# Patient Record
Sex: MALE | Race: WHITE | NOT HISPANIC OR LATINO | Employment: FULL TIME | URBAN - METROPOLITAN AREA
[De-identification: names, ages, dates, MRNs, and addresses within clinical notes are randomized per-mention and may not be internally consistent; named-entity substitution may affect disease eponyms.]

---

## 2017-01-19 ENCOUNTER — OFFICE VISIT (OUTPATIENT)
Dept: CARDIOLOGY | Facility: MEDICAL CENTER | Age: 52
End: 2017-01-19
Payer: COMMERCIAL

## 2017-01-19 VITALS
HEIGHT: 73 IN | BODY MASS INDEX: 28.63 KG/M2 | HEART RATE: 121 BPM | SYSTOLIC BLOOD PRESSURE: 164 MMHG | OXYGEN SATURATION: 96 % | WEIGHT: 216 LBS | DIASTOLIC BLOOD PRESSURE: 100 MMHG

## 2017-01-19 DIAGNOSIS — N52.2 DRUG-INDUCED ERECTILE DYSFUNCTION: ICD-10-CM

## 2017-01-19 DIAGNOSIS — I50.21 ACUTE SYSTOLIC CONGESTIVE HEART FAILURE, NYHA CLASS 4 (HCC): ICD-10-CM

## 2017-01-19 DIAGNOSIS — I42.0 DILATED CARDIOMYOPATHY (HCC): ICD-10-CM

## 2017-01-19 DIAGNOSIS — I10 ESSENTIAL HYPERTENSION: ICD-10-CM

## 2017-01-19 DIAGNOSIS — I42.9 CARDIOMYOPATHY (HCC): ICD-10-CM

## 2017-01-19 DIAGNOSIS — I42.0 CARDIOMYOPATHY, DILATED (HCC): ICD-10-CM

## 2017-01-19 DIAGNOSIS — I10 ESSENTIAL HYPERTENSION, BENIGN: ICD-10-CM

## 2017-01-19 PROCEDURE — 99214 OFFICE O/P EST MOD 30 MIN: CPT | Performed by: INTERNAL MEDICINE

## 2017-01-19 RX ORDER — CHLORTHALIDONE 25 MG/1
25 TABLET ORAL DAILY
Qty: 90 TAB | Refills: 3 | Status: SHIPPED | OUTPATIENT
Start: 2017-01-19 | End: 2018-02-15 | Stop reason: SDUPTHER

## 2017-01-19 RX ORDER — LISINOPRIL 40 MG/1
40 TABLET ORAL 2 TIMES DAILY
Qty: 180 TAB | Refills: 3 | Status: SHIPPED | OUTPATIENT
Start: 2017-01-19 | End: 2018-02-15 | Stop reason: SDUPTHER

## 2017-01-19 RX ORDER — SILDENAFIL 50 MG/1
50 TABLET, FILM COATED ORAL PRN
Qty: 10 TAB | Refills: 11 | Status: SHIPPED | OUTPATIENT
Start: 2017-01-19 | End: 2017-09-06 | Stop reason: SDUPTHER

## 2017-01-19 RX ORDER — CARVEDILOL 25 MG/1
50 TABLET ORAL 2 TIMES DAILY WITH MEALS
Qty: 360 TAB | Refills: 3 | Status: SHIPPED | OUTPATIENT
Start: 2017-01-19 | End: 2018-02-15 | Stop reason: SDUPTHER

## 2017-01-19 RX ORDER — AMLODIPINE BESYLATE 5 MG/1
5 TABLET ORAL DAILY
Qty: 90 TAB | Refills: 3 | Status: SHIPPED | OUTPATIENT
Start: 2017-01-19 | End: 2018-02-15 | Stop reason: SDUPTHER

## 2017-01-19 RX ORDER — SPIRONOLACTONE 25 MG/1
25 TABLET ORAL DAILY
Qty: 90 TAB | Refills: 3 | Status: SHIPPED | OUTPATIENT
Start: 2017-01-19 | End: 2018-02-15 | Stop reason: SDUPTHER

## 2017-01-19 ASSESSMENT — ENCOUNTER SYMPTOMS
PALPITATIONS: 0
SHORTNESS OF BREATH: 0

## 2017-01-19 NOTE — PROGRESS NOTES
"Subjective:   Mack Galloway is a 51 y.o. male who presents today for follow up of cardiomyopathy    Doing generally very well.    No heart failure symptoms.  \"I feel great\"    They stopped filling meds so he stopped taking them    Past Medical History   Diagnosis Date   • Diabetes mellitus (CMS-[facility]) 10/29/2014     2007    • Acute systolic congestive heart failure, NYHA class 4 (CMS-HCC) 10/29/2014   • Type II or unspecified type diabetes mellitus without mention of complication, not stated as uncontrolled    • Hypertension      Past Surgical History   Procedure Laterality Date   • Appendectomy     • Recovery  2/3/2015     Performed by Cath-Recovery Surgery at SURGERY SAME DAY [facility] ORS     Family History   Problem Relation Age of Onset   • Heart Disease Father      CAD   • Heart Disease Paternal Grandfather    • Diabetes Father      History   Smoking status   • Never Smoker    Smokeless tobacco   • Current User   • Types: Chew     Allergies   Allergen Reactions   • Kiwi Extract Swelling     Outpatient Encounter Prescriptions as of 1/19/2017   Medication Sig Dispense Refill   • lisinopril (PRINIVIL, ZESTRIL) 40 MG tablet Take 1 Tab by mouth 2 times a day. 60 Tab 6   • spironolactone (ALDACTONE) 25 MG Tab Take 1 Tab by mouth every day. 30 Tab 11   • metformin ER (GLUCOPHAGE XR) 500 MG TABLET SR 24 HR Take 1 Tab by mouth 2 times a day. 60 Tab 11   • aspirin (ASA) 81 MG CHEW chewable tablet Take 1 Tab by mouth every day. 100 Tab 2   • carvedilol (COREG) 25 MG Tab TAKE TWO TABLETS BY MOUTH TWICE DAILY WITH MEALS (Patient not taking: Reported on 1/19/2017) 120 Tab 0   • amlodipine (NORVASC) 5 MG Tab TAKE ONE TABLET BY MOUTH ONE TIME DAILY  (Patient not taking: Reported on 1/19/2017) 30 Tab 6   • sildenafil citrate (VIAGRA) 50 MG tablet Take 1 Tab by mouth as needed for Erectile Dysfunction. (Patient not taking: Reported on 1/19/2017) 10 Tab 3   • chlorthalidone (HYGROTON) 25 MG TABS TAKE ONE TABLET BY MOUTH ONE " "TIME DAILY  (Patient not taking: Reported on 1/19/2017) 30 Tab 3     No facility-administered encounter medications on file as of 1/19/2017.     Review of Systems   Respiratory: Negative for shortness of breath.    Cardiovascular: Negative for chest pain and palpitations.        Objective:   /100 mmHg  Pulse 121  Ht 1.854 m (6' 0.99\")  Wt 97.977 kg (216 lb)  BMI 28.50 kg/m2  SpO2 96%    Physical Exam   Constitutional: He is oriented to person, place, and time. He appears well-developed and well-nourished. No distress.   HENT:   Head: Normocephalic and atraumatic.   Right Ear: External ear normal.   Left Ear: External ear normal.   Nose: Nose normal.   Mouth/Throat: Oropharynx is clear and moist.   Eyes: Conjunctivae and EOM are normal. Pupils are equal, round, and reactive to light. Right eye exhibits no discharge. Left eye exhibits no discharge. No scleral icterus.   Neck: Normal range of motion. Neck supple. No JVD present. No tracheal deviation present.   Cardiovascular: Normal rate, regular rhythm, normal heart sounds and intact distal pulses.  Exam reveals no gallop and no friction rub.    No murmur heard.  Pulmonary/Chest: Effort normal and breath sounds normal. No stridor. No respiratory distress. He has no wheezes. He has no rales. He exhibits no tenderness.   Abdominal: Soft. He exhibits no distension. There is no tenderness.   Musculoskeletal: He exhibits no edema or tenderness.   Neurological: He is alert and oriented to person, place, and time. No cranial nerve deficit. Coordination normal.   Skin: Skin is warm and dry. No rash noted. He is not diaphoretic. No erythema. No pallor.   Psychiatric: He has a normal mood and affect. His behavior is normal. Judgment and thought content normal.   Vitals reviewed.      Assessment:     1. Cardiomyopathy, dilated (CMS-HCC)  EKG   2. Essential hypertension  EKG       Medical Decision Making:  Today's Assessment / Status / Plan:     Doing well.  Will wait " and reimage at follow up as he has been off meds.  Need better treatment of htn.  Get back on all meds.  Labs afterward.

## 2017-01-19 NOTE — MR AVS SNAPSHOT
"        Mack Galloway   2017 8:40 AM   Office Visit   MRN: [id number]    Department:  [facility]   Dept Phone:  [phone]    Description:  Male : 1965   Provider:  Sarath Potter M.D.           Reason for Visit     Follow-Up           Allergies as of 2017     Allergen Noted Reactions    Kiwi Extract 10/31/2014   Swelling      You were diagnosed with     Cardiomyopathy, dilated (CMS-HCC)   [767379]       Essential hypertension   [0971177]       Dilated cardiomyopathy (CMS-HCC)   [525001]       Acute systolic congestive heart failure, NYHA class 4 (CMS-HCC)   [740051]       Cardiomyopathy (CMS-HCC)   [7607311]       Drug-induced erectile dysfunction   [958040]       Essential hypertension, benign   [401.1.ICD-9-CM]         Vital Signs     Blood Pressure Pulse Height Weight Body Mass Index Oxygen Saturation    164/100 mmHg 121 1.854 m (6' 0.99\") 97.977 kg (216 lb) 28.50 kg/m2 96%    Smoking Status                   Never Smoker            Basic Information     Date Of Birth Sex Race Ethnicity Preferred Language    1965 Male White Non- English      Problem List              ICD-10-CM Priority Class Noted - Resolved    Diabetes mellitus (CMS-HCC) E11.9   10/29/2014 - Present    Dilated cardiomyopathy (CMS-HCC) I42.0 High  10/29/2014 - Present    DM (diabetes mellitus) (CMS-HCC) E11.9 Medium  10/31/2014 - Present    Hypertension I10 Medium  10/31/2014 - Present    Chronic systolic congestive heart failure, NYHA class 3 (CMS-[facility]) I50.22   2015 - Present    Other primary cardiomyopathies (CMS-HCC) I42.8   2/3/2015 - Present      Health Maintenance        Date Due Completion Dates    IMM HEP B VACCINE (1 of 3 - Primary Series) 1965 ---    DIABETES MONOFILAMENT / LE EXAM 1964 ---    RETINAL SCREENING 1981 ---    URINE ACR / MICROALBUMIN 1981 ---    IMM DTaP/Tdap/Td Vaccine (1 - Tdap) 1982 ---    COLONOSCOPY 2013 ---    A1C SCREENING 2015 10/29/2014   " FASTING LIPID PROFILE 10/30/2015 10/30/2014    SERUM CREATININE 2/3/2016 2/3/2015, 11/2/2014, 11/1/2014, 10/30/2014, 10/29/2014    IMM INFLUENZA (1) 9/1/2016 ---            Results       Current Immunizations     Pneumococcal polysaccharide vaccine (PPSV-23) 10/29/2014  2:40 PM      Below and/or attached are the medications your provider expects you to take. Review all of your home medications and newly ordered medications with your provider and/or pharmacist. Follow medication instructions as directed by your provider and/or pharmacist. Please keep your medication list with you and share with your provider. Update the information when medications are discontinued, doses are changed, or new medications (including over-the-counter products) are added; and carry medication information at all times in the event of emergency situations     Allergies:  KIWI EXTRACT - Swelling               Medications  Valid as of: January 19, 2017 -  9:16 AM    Generic Name Brand Name Tablet Size Instructions for use    AmLODIPine Besylate (Tab) NORVASC 5 MG Take 1 Tab by mouth every day.        Aspirin (Chew Tab) ASA 81 MG Take 1 Tab by mouth every day.        Carvedilol (Tab) COREG 25 MG Take 2 Tabs by mouth 2 times a day, with meals.        Chlorthalidone (Tab) HYGROTON 25 MG Take 1 Tab by mouth every day.        Lisinopril (Tab) PRINIVIL, ZESTRIL 40 MG Take 1 Tab by mouth 2 times a day.        MetFORMIN HCl (TABLET SR 24 HR) GLUCOPHAGE  MG Take 1 Tab by mouth 2 times a day.        Sildenafil Citrate (Tab) VIAGRA 50 MG Take 1 Tab by mouth as needed for Erectile Dysfunction.        Spironolactone (Tab) ALDACTONE 25 MG Take 1 Tab by mouth every day.        .                 Medicines prescribed today were sent to:     [id number] [place] [facility] [place] - [address]    [address] [place] [address]    Phone: [phone] Fax: [phone]    Open 24 Hours?: No      Medication refill instructions:       If your  prescription bottle indicates you have medication refills left, it is not necessary to call your provider’s office. Please contact your pharmacy and they will refill your medication.    If your prescription bottle indicates you do not have any refills left, you may request refills at any time through one of the following ways: The online [employer] system (except Urgent Care), by calling your provider’s office, or by asking your pharmacy to contact your provider’s office with a refill request. Medication refills are processed only during regular business hours and may not be available until the next business day. Your provider may request additional information or to have a follow-up visit with you prior to refilling your medication.   *Please Note: Medication refills are assigned a new Rx number when refilled electronically. Your pharmacy may indicate that no refills were authorized even though a new prescription for the same medication is available at the pharmacy. Please request the medicine by name with the pharmacy before contacting your provider for a refill.        Your To Do List     Future Labs/Procedures Complete By Expires    BASIC METABOLIC PANEL  As directed 1/19/2018         [employer] Status: Patient Declined

## 2017-01-20 LAB — EKG IMPRESSION: NORMAL

## 2017-04-25 ENCOUNTER — TELEPHONE (OUTPATIENT)
Dept: CARDIOLOGY | Facility: MEDICAL CENTER | Age: 52
End: 2017-04-25

## 2017-04-25 NOTE — TELEPHONE ENCOUNTER
----- Message from Casey Young sent at 4/25/2017 10:42 AM PDT -----  Regarding: Problem with medication   Contact: [phone]  QUINTIN/Mile,    Patient states he's experiencing some side affects due to his medication(carvedilol) such as dizziness. He would please like a call back and can be reached at [phone].

## 2017-04-25 NOTE — TELEPHONE ENCOUNTER
Pt states he has been getting lightheaded, dizzy and nauseated since starting carvedilol in Jan. Happens every time he takes and lasts all day long he states. He never made it to max dose requested by JE but has no idea what dose he is on. Pt advised call in am with dose when he gets home and can check it, check BP if gets lightheaded and dizzy at home.  Then will discuss with JE to advise on dose

## 2017-09-05 ENCOUNTER — TELEPHONE (OUTPATIENT)
Dept: CARDIOLOGY | Facility: MEDICAL CENTER | Age: 52
End: 2017-09-05

## 2017-09-05 NOTE — TELEPHONE ENCOUNTER
----- Message from Lauryn Hastings, Med Ass't sent at 9/5/2017 10:26 AM PDT -----  Regarding: FW: renewal viagra  Contact: [phone]  Okay to fill?  Please phone in new RX or JE approve if so  Thank you     ----- Message -----  From: Libia Ly  Sent: 9/5/2017   8:30 AM  To: Lauryn Hastings Med Ass't  Subject: renewal viagra                                   QUINTIN/lauryn    Pt calling for renewal of sildenafil citrate (VIAGRA) 50 MG tablet.    Pt will be coming from [place], arriving at pharmacy around noon today, to  all other meds and is hoping this will be ready for  as well.  ([facility] on [address] in [place])    Pt is at  if questions.

## 2017-09-06 DIAGNOSIS — N52.2 DRUG-INDUCED ERECTILE DYSFUNCTION: ICD-10-CM

## 2017-09-06 RX ORDER — SILDENAFIL 50 MG/1
50 TABLET, FILM COATED ORAL PRN
Qty: 10 TAB | Refills: 6 | Status: SHIPPED | OUTPATIENT
Start: 2017-09-06 | End: 2017-10-12 | Stop reason: SDUPTHER

## 2017-10-05 ENCOUNTER — TELEPHONE (OUTPATIENT)
Dept: CARDIOLOGY | Facility: MEDICAL CENTER | Age: 52
End: 2017-10-05

## 2017-10-05 NOTE — TELEPHONE ENCOUNTER
----- Message from Lauryn Hastings, Med Ass't sent at 10/5/2017  3:00 PM PDT -----  Regarding: FW: Patient wants prescription for Viagra  I spoke with the patient and he would like a new RX phoned in with a new dose, patient states that he needs the RX to state 2 tablets for a 100mg dose    ----- Message -----  From: Dayana Cardenas  Sent: 10/5/2017   2:43 PM  To: Lauryn Hastings, Med Ass't  Subject: Patient wants prescription for Viagra            Lauryn,    This is a patient of Dr Potter. He wants to get a prescription for Viagra.

## 2017-10-05 NOTE — TELEPHONE ENCOUNTER
Dr. Potter are you OK with prescribing viagra as a dose of two 50mg tablets to equal 100mg?     You previously OK'd the 50mg tablets, #10.

## 2017-10-12 DIAGNOSIS — N52.2 DRUG-INDUCED ERECTILE DYSFUNCTION: ICD-10-CM

## 2017-10-12 RX ORDER — SILDENAFIL 50 MG/1
100 TABLET, FILM COATED ORAL PRN
Qty: 20 TAB | Refills: 0
Start: 2017-10-12

## 2018-02-15 DIAGNOSIS — I42.0 DILATED CARDIOMYOPATHY (HCC): ICD-10-CM

## 2018-02-15 DIAGNOSIS — I50.21 ACUTE SYSTOLIC CONGESTIVE HEART FAILURE, NYHA CLASS 4 (HCC): ICD-10-CM

## 2018-02-15 DIAGNOSIS — I10 ESSENTIAL HYPERTENSION, BENIGN: ICD-10-CM

## 2018-02-15 DIAGNOSIS — I10 ESSENTIAL HYPERTENSION: ICD-10-CM

## 2018-02-15 RX ORDER — AMLODIPINE BESYLATE 5 MG/1
5 TABLET ORAL DAILY
Qty: 90 TAB | Refills: 0 | OUTPATIENT
Start: 2018-02-15 | End: 2020-10-16

## 2018-02-15 RX ORDER — SPIRONOLACTONE 25 MG/1
25 TABLET ORAL DAILY
Qty: 90 TAB | Refills: 0 | OUTPATIENT
Start: 2018-02-15 | End: 2020-10-16

## 2018-02-15 RX ORDER — CHLORTHALIDONE 25 MG/1
25 TABLET ORAL DAILY
Qty: 90 TAB | Refills: 0 | OUTPATIENT
Start: 2018-02-15 | End: 2020-10-16

## 2018-02-15 RX ORDER — CARVEDILOL 25 MG/1
50 TABLET ORAL 2 TIMES DAILY WITH MEALS
Qty: 360 TAB | Refills: 0 | OUTPATIENT
Start: 2018-02-15 | End: 2020-10-16

## 2018-02-15 RX ORDER — LISINOPRIL 40 MG/1
40 TABLET ORAL 2 TIMES DAILY
Qty: 180 TAB | Refills: 0 | OUTPATIENT
Start: 2018-02-15 | End: 2018-09-27 | Stop reason: SDUPTHER

## 2018-04-02 ENCOUNTER — TELEPHONE (OUTPATIENT)
Dept: CARDIOLOGY | Facility: MEDICAL CENTER | Age: 53
End: 2018-04-02

## 2018-04-02 NOTE — TELEPHONE ENCOUNTER
PAR sent to [facility] in [place]:  STEVIE SHORE (Key: [id number]) - [id number]   Status: Sent to Plan on April 2nd, 2018  The plan will fax you a determination, typically within 1 to 5 business days.   Drug: Sildenafil Citrate 50MG tablets   Form: Medical Necessity Request Form for [employer] Members Only.   Phone: [phone]   Fax: [phone]   Claim Rejection Details   Code: 70  Message: NDC Not Covered     Status  Sent to [facility]  Next Steps  The plan will fax you a determination, typically within 1 to 5 business days.  How do I follow up?  DrugSildenafil Citrate 50MG tablets  Form[employer] [place] [employer] Medical Necessity Request FormMedical Necessity Request Form for [employer] Members [phone]  Original Claim Info70 NDC Not Covered

## 2018-09-27 DIAGNOSIS — I42.0 DILATED CARDIOMYOPATHY (HCC): ICD-10-CM

## 2018-09-27 DIAGNOSIS — I50.21 ACUTE SYSTOLIC CONGESTIVE HEART FAILURE, NYHA CLASS 4 (HCC): ICD-10-CM

## 2018-10-01 RX ORDER — LISINOPRIL 40 MG/1
40 TABLET ORAL 2 TIMES DAILY
Qty: 180 TAB | Refills: 0 | Status: SHIPPED | OUTPATIENT
Start: 2018-10-01 | End: 2020-10-16

## 2018-10-12 ENCOUNTER — TELEPHONE (OUTPATIENT)
Dept: CARDIOLOGY | Facility: MEDICAL CENTER | Age: 53
End: 2018-10-12

## 2018-11-07 ENCOUNTER — TELEPHONE (OUTPATIENT)
Dept: CARDIOLOGY | Facility: MEDICAL CENTER | Age: 53
End: 2018-11-07

## 2018-11-07 NOTE — TELEPHONE ENCOUNTER
Lm and letter sent to cb and sched PP with [facility] in [place] or [place] for future rx refills.

## 2020-10-16 ENCOUNTER — TELEPHONE (OUTPATIENT)
Dept: CARDIOLOGY | Facility: MEDICAL CENTER | Age: 55
End: 2020-10-16

## 2020-10-16 ENCOUNTER — OFFICE VISIT (OUTPATIENT)
Dept: CARDIOLOGY | Facility: MEDICAL CENTER | Age: 55
End: 2020-10-16
Payer: COMMERCIAL

## 2020-10-16 VITALS
BODY MASS INDEX: 27.85 KG/M2 | OXYGEN SATURATION: 95 % | HEIGHT: 74 IN | WEIGHT: 217 LBS | HEART RATE: 79 BPM | SYSTOLIC BLOOD PRESSURE: 142 MMHG | DIASTOLIC BLOOD PRESSURE: 90 MMHG

## 2020-10-16 DIAGNOSIS — I50.20 ACC/AHA STAGE C SYSTOLIC HEART FAILURE (HCC): ICD-10-CM

## 2020-10-16 DIAGNOSIS — Z79.899 HIGH RISK MEDICATION USE: ICD-10-CM

## 2020-10-16 DIAGNOSIS — I10 HTN (HYPERTENSION), MALIGNANT: ICD-10-CM

## 2020-10-16 DIAGNOSIS — I42.8 NON-ISCHEMIC CARDIOMYOPATHY (HCC): ICD-10-CM

## 2020-10-16 DIAGNOSIS — R06.09 DYSPNEA ON EXERTION: ICD-10-CM

## 2020-10-16 DIAGNOSIS — I51.89 LEFT VENTRICULAR SYSTOLIC DYSFUNCTION, NYHA CLASS 2: ICD-10-CM

## 2020-10-16 LAB — EKG IMPRESSION: NORMAL

## 2020-10-16 PROCEDURE — 93000 ELECTROCARDIOGRAM COMPLETE: CPT | Performed by: INTERNAL MEDICINE

## 2020-10-16 PROCEDURE — 99205 OFFICE O/P NEW HI 60 MIN: CPT | Performed by: INTERNAL MEDICINE

## 2020-10-16 RX ORDER — CARVEDILOL 12.5 MG/1
12.5 TABLET ORAL 2 TIMES DAILY WITH MEALS
Qty: 60 TAB | Refills: 11 | Status: SHIPPED | OUTPATIENT
Start: 2020-10-16 | End: 2020-12-29

## 2020-10-16 RX ORDER — SPIRONOLACTONE 25 MG/1
25 TABLET ORAL DAILY
Qty: 30 TAB | Refills: 3 | Status: SHIPPED | OUTPATIENT
Start: 2020-10-16 | End: 2021-03-15 | Stop reason: SDUPTHER

## 2020-10-16 RX ORDER — POTASSIUM CHLORIDE 750 MG/1
CAPSULE, EXTENDED RELEASE ORAL
COMMUNITY
Start: 2020-09-23 | End: 2021-03-31

## 2020-10-16 RX ORDER — SACUBITRIL AND VALSARTAN 49; 51 MG/1; MG/1
1 TABLET, FILM COATED ORAL 2 TIMES DAILY
Qty: 60 TAB | Refills: 11 | Status: SHIPPED | OUTPATIENT
Start: 2020-10-16 | End: 2020-12-09

## 2020-10-16 RX ORDER — FUROSEMIDE 40 MG/1
TABLET ORAL
COMMUNITY
Start: 2020-10-08 | End: 2020-10-16

## 2020-10-16 RX ORDER — LOSARTAN POTASSIUM 100 MG/1
TABLET ORAL
COMMUNITY
Start: 2020-09-23 | End: 2020-10-16

## 2020-10-16 RX ORDER — FUROSEMIDE 40 MG/1
40 TABLET ORAL DAILY
Qty: 30 TAB | Refills: 11 | Status: SHIPPED | OUTPATIENT
Start: 2020-10-16 | End: 2020-12-29 | Stop reason: SDUPTHER

## 2020-10-16 ASSESSMENT — ENCOUNTER SYMPTOMS
FEVER: 0
SENSORY CHANGE: 0
SHORTNESS OF BREATH: 1
DIAPHORESIS: 0
COUGH: 0
BRUISES/BLEEDS EASILY: 0
PALPITATIONS: 0
DOUBLE VISION: 0
MEMORY LOSS: 0
FALLS: 0
MYALGIAS: 0
DIZZINESS: 0
BLURRED VISION: 0
ABDOMINAL PAIN: 0
HEADACHES: 0
DEPRESSION: 0

## 2020-10-16 NOTE — PATIENT INSTRUCTIONS
Stop Losartan 100 mg.    Start Entresto 49/51 mg twice day.    Will increase Carvedilol to 12.5 mg po twice daily.    Start Spironolactone 25 mg once a day.    Reduce Furosemide to 40 mg once a day (this one, you could on your own based swelling).

## 2020-10-16 NOTE — PROGRESS NOTES
Chief Complaint   Patient presents with   • HTN (Controlled)       Subjective:   Mack Galloway is a 55 y.o. male who presents today for cardiac care and management in a heart failure clinic due to recent finding of LV dysfunction on echocardiogram which showed LVEF of 25%.  Patient does have a prior history of dilated cardiomyopathy for which he was cared for by Dr. Sarath Potter in the past.  Patient did have a coronary angiogram which showed nonobstructive CAD in 2015. His EF normalized however, patient stopped going to the doctor and stopped taking his medications.  He is now back with dilated cardiomyopathy.    Patient is feeling better these days. Does get winded upon walking up inclines or for distance. No symptoms at rest or with daily living activities.    I have personally interpreted her EKG today with patient, there is no evidence of acute coronary syndrome, no evidence of prior infarct, normal NV and QT interval, no significant conduction disease.      Past Medical History:   Diagnosis Date   • Acute systolic congestive heart failure, NYHA class 4 ([facility]) 10/29/2014   • Diabetes mellitus ([facility]) 10/29/2014    2007    • Hypertension    • Type II or unspecified type diabetes mellitus without mention of complication, not stated as uncontrolled      Past Surgical History:   Procedure Laterality Date   • RECOVERY  2/3/2015    Performed by Cath-Recovery Surgery at SURGERY SAME DAY [facility] ORS   • APPENDECTOMY       Family History   Problem Relation Age of Onset   • Heart Disease Father         CAD   • Heart Disease Paternal Grandfather    • Diabetes Father      Social History     Socioeconomic History   • Marital status: Single     Spouse name: Not on file   • Number of children: Not on file   • Years of education: Not on file   • Highest education level: Not on file   Occupational History   • Not on file   Social Needs   • Financial resource strain: Not on file   • Food insecurity     Worry: Not on file      Inability: Not on file   • Transportation needs     Medical: Not on file     Non-medical: Not on file   Tobacco Use   • Smoking status: Never Smoker   • Smokeless tobacco: Current User     Types: Chew   Substance and Sexual Activity   • Alcohol use: No   • Drug use: No   • Sexual activity: Not on file   Lifestyle   • Physical activity     Days per week: Not on file     Minutes per session: Not on file   • Stress: Not on file   Relationships   • Social connections     Talks on phone: Not on file     Gets together: Not on file     Attends Faith service: Not on file     Active member of club or organization: Not on file     Attends meetings of clubs or organizations: Not on file     Relationship status: Not on file   • Intimate partner violence     Fear of current or ex partner: Not on file     Emotionally abused: Not on file     Physically abused: Not on file     Forced sexual activity: Not on file   Other Topics Concern   • Not on file   Social History Narrative   • Not on file     Allergies   Allergen Reactions   • Kiwi Extract Swelling     Outpatient Encounter Medications as of 10/16/2020   Medication Sig Dispense Refill   • potassium chloride (MICRO-K) 10 MEQ capsule      • sacubitril-valsartan (ENTRESTO) 49-51 MG Tab tablet Take 1 Tab by mouth 2 Times a Day. 60 Tab 11   • carvedilol (COREG) 12.5 MG Tab Take 1 Tab by mouth 2 times a day, with meals. 60 Tab 11   • spironolactone (ALDACTONE) 25 MG Tab Take 1 Tab by mouth every day. 30 Tab 3   • furosemide (LASIX) 40 MG Tab Take 1 Tab by mouth every day. 30 Tab 11   • sildenafil citrate (VIAGRA) 50 MG tablet Take 2 Tabs by mouth as needed for Erectile Dysfunction. 20 Tab 0   • metformin ER (GLUCOPHAGE XR) 500 MG TABLET SR 24 HR Take 1 Tab by mouth 2 times a day. 60 Tab 11   • metFORMIN (GLUCOPHAGE) 500 MG Tab      • [DISCONTINUED] losartan (COZAAR) 100 MG Tab      • [DISCONTINUED] furosemide (LASIX) 40 MG Tab      • [DISCONTINUED] lisinopril (PRINIVIL, ZESTRIL)  "40 MG tablet Take 1 Tab by mouth 2 times a day. For further refills please contact new cardiologist. Thank you (Patient not taking: Reported on 10/16/2020) 180 Tab 0   • [DISCONTINUED] spironolactone (ALDACTONE) 25 MG Tab Take 1 Tab by mouth every day. (Patient not taking: Reported on 10/16/2020) 90 Tab 0   • [DISCONTINUED] carvedilol (COREG) 25 MG Tab Take 2 Tabs by mouth 2 times a day, with meals. (Patient taking differently: Take 6.25 mg by mouth 2 times a day, with meals.) 360 Tab 0   • [DISCONTINUED] amLODIPine (NORVASC) 5 MG Tab Take 1 Tab by mouth every day. (Patient not taking: Reported on 10/16/2020) 90 Tab 0   • [DISCONTINUED] chlorthalidone (HYGROTON) 25 MG Tab Take 1 Tab by mouth every day. (Patient not taking: Reported on 10/16/2020) 90 Tab 0   • [DISCONTINUED] aspirin (ASA) 81 MG CHEW chewable tablet Take 1 Tab by mouth every day. (Patient not taking: Reported on 10/16/2020) 100 Tab 2     No facility-administered encounter medications on file as of 10/16/2020.      Review of Systems   Constitutional: Negative for diaphoresis and fever.   HENT: Negative for nosebleeds.    Eyes: Negative for blurred vision and double vision.   Respiratory: Positive for shortness of breath. Negative for cough.    Cardiovascular: Negative for chest pain and palpitations.   Gastrointestinal: Negative for abdominal pain.   Genitourinary: Negative for dysuria and frequency.   Musculoskeletal: Negative for falls and myalgias.   Skin: Negative for rash.   Neurological: Negative for dizziness, sensory change and headaches.   Endo/Heme/Allergies: Does not bruise/bleed easily.   Psychiatric/Behavioral: Negative for depression and memory loss.        Objective:   /90 (BP Location: Left arm, Patient Position: Sitting, BP Cuff Size: Adult)   Pulse 79   Ht 1.88 m (6' 2\")   Wt 98.4 kg (217 lb)   SpO2 95%   BMI 27.86 kg/m²     Physical Exam  Constitutional:   Well appearing, no acute distress  Heart: no pitting edema in " BLE.  Lungs: no breathing distress, not tachypneic  Abdomen: no distention  Neurology: no signs of focal deficits.  Mentation is alert.    Assessment:     1. HTN (hypertension), malignant  EKG    Basic Metabolic Panel   2. ACC/AHA stage C systolic heart failure (HCC)  Basic Metabolic Panel   3. Left ventricular systolic dysfunction, NYHA class 2  Basic Metabolic Panel   4. Non-ischemic cardiomyopathy (HCC)  Basic Metabolic Panel   5. Dyspnea on exertion  Basic Metabolic Panel   6. High risk medication use         Medical Decision Making:  Today's Assessment / Status / Plan:   Non Ischemic Cardiomyopathy:  Chronically illed condition which requires ongoing close monitoring and treatment to improve survival rate along with decreasing risk of clinical decompensation and hospitalization.    Today, based on physical examination findings, patient is euvolemic. No JVD, lungs are clear to auscultation, no pitting edema in bilateral lower extremities, no ascites.     Dry weight is 217 lbs.     Stop Losartan 100 mg.    Start Entresto 49/51 mg twice day.    Will increase Carvedilol to 12.5 mg po twice daily.    Start Spironolactone 25 mg once a day.    Reduce Furosemide to 40 mg once a day (this one, you could on your own based swelling).    Suspect HTN heart disease underlying as cause of dilated cardiomyopathy.    We will consider ICD placement for primary prevention in 3-6 months if her left ventricular systolic function remains less than 35% after optimization of her evidence based heart failure medical regimen.    Hypertension:  Blood pressure is high.  Optimize control using cardiomyopathy medical regimen as well.

## 2020-10-16 NOTE — TELEPHONE ENCOUNTER
Mack Galloway (Key: [id number]) - [id number]  Entresto 49-51MG tablets  Status: Sent to Plan  Created: October 16th, 2020 [phone]  Sent: October 16th, 2020  Open  [place] as not sent  Archive

## 2020-10-23 ENCOUNTER — TELEPHONE (OUTPATIENT)
Dept: CARDIOLOGY | Facility: MEDICAL CENTER | Age: 55
End: 2020-10-23

## 2020-10-23 NOTE — TELEPHONE ENCOUNTER
TT    10/23/20 - Per [facility] Report - Jeannette with [employer] would like a call back. She spoke with pt's insurance plan and they are waiting on the additional clinical questions that was sent to the office, if information is not submitted by 10/23, it will be considered not approved for the pt's entresto reference #[id number].    Please return call to [phone].    Thank you

## 2020-12-03 DIAGNOSIS — I42.8 NON-ISCHEMIC CARDIOMYOPATHY (HCC): ICD-10-CM

## 2020-12-03 DIAGNOSIS — I51.89 LEFT VENTRICULAR SYSTOLIC DYSFUNCTION, NYHA CLASS 2: ICD-10-CM

## 2020-12-03 DIAGNOSIS — I10 HTN (HYPERTENSION), MALIGNANT: ICD-10-CM

## 2020-12-03 DIAGNOSIS — I50.20 ACC/AHA STAGE C SYSTOLIC HEART FAILURE (HCC): ICD-10-CM

## 2020-12-03 DIAGNOSIS — R06.09 DYSPNEA ON EXERTION: ICD-10-CM

## 2020-12-09 ENCOUNTER — TELEPHONE (OUTPATIENT)
Dept: CARDIOLOGY | Facility: MEDICAL CENTER | Age: 55
End: 2020-12-09

## 2020-12-09 ENCOUNTER — OFFICE VISIT (OUTPATIENT)
Dept: CARDIOLOGY | Facility: MEDICAL CENTER | Age: 55
End: 2020-12-09
Payer: COMMERCIAL

## 2020-12-09 VITALS
DIASTOLIC BLOOD PRESSURE: 98 MMHG | OXYGEN SATURATION: 95 % | SYSTOLIC BLOOD PRESSURE: 132 MMHG | BODY MASS INDEX: 26.69 KG/M2 | RESPIRATION RATE: 18 BRPM | WEIGHT: 208 LBS | HEART RATE: 73 BPM | HEIGHT: 74 IN

## 2020-12-09 DIAGNOSIS — I42.8 NON-ISCHEMIC CARDIOMYOPATHY (HCC): ICD-10-CM

## 2020-12-09 DIAGNOSIS — Z79.899 HIGH RISK MEDICATION USE: ICD-10-CM

## 2020-12-09 DIAGNOSIS — I10 HTN (HYPERTENSION), MALIGNANT: ICD-10-CM

## 2020-12-09 DIAGNOSIS — I50.20 ACC/AHA STAGE C SYSTOLIC HEART FAILURE (HCC): ICD-10-CM

## 2020-12-09 DIAGNOSIS — I51.89 LEFT VENTRICULAR SYSTOLIC DYSFUNCTION, NYHA CLASS 2: ICD-10-CM

## 2020-12-09 DIAGNOSIS — R06.09 DYSPNEA ON EXERTION: ICD-10-CM

## 2020-12-09 PROCEDURE — 99215 OFFICE O/P EST HI 40 MIN: CPT | Mod: 25 | Performed by: INTERNAL MEDICINE

## 2020-12-09 RX ORDER — FUROSEMIDE 20 MG/1
TABLET ORAL
COMMUNITY
Start: 2020-09-23 | End: 2020-12-09

## 2020-12-09 RX ORDER — SACUBITRIL AND VALSARTAN 97; 103 MG/1; MG/1
1 TABLET, FILM COATED ORAL 2 TIMES DAILY
Qty: 60 TAB | Refills: 11 | Status: SHIPPED | OUTPATIENT
Start: 2020-12-09 | End: 2020-12-29 | Stop reason: SDUPTHER

## 2020-12-09 RX ORDER — CARVEDILOL 6.25 MG/1
TABLET ORAL
COMMUNITY
Start: 2020-10-08 | End: 2020-12-09

## 2020-12-09 RX ORDER — CARVEDILOL 3.12 MG/1
TABLET ORAL
COMMUNITY
Start: 2020-10-01 | End: 2020-12-09

## 2020-12-09 RX ORDER — LOSARTAN POTASSIUM 100 MG/1
TABLET ORAL
COMMUNITY
Start: 2020-10-28 | End: 2020-12-09

## 2020-12-09 RX ORDER — DAPAGLIFLOZIN 10 MG/1
10 TABLET, FILM COATED ORAL DAILY
Qty: 30 TAB | Refills: 11 | Status: SHIPPED | OUTPATIENT
Start: 2020-12-09 | End: 2020-12-29 | Stop reason: SDUPTHER

## 2020-12-09 ASSESSMENT — ENCOUNTER SYMPTOMS
BRUISES/BLEEDS EASILY: 0
ABDOMINAL PAIN: 0
DIAPHORESIS: 0
DEPRESSION: 0
PALPITATIONS: 0
DOUBLE VISION: 0
FEVER: 0
MEMORY LOSS: 0
DIZZINESS: 0
SENSORY CHANGE: 0
HEADACHES: 0
MYALGIAS: 0
COUGH: 0
SHORTNESS OF BREATH: 1
FALLS: 0
BLURRED VISION: 0

## 2020-12-09 NOTE — PROGRESS NOTES
Chief Complaint   Patient presents with   • HTN (Controlled)     &  Dilated cardiomyopathy        Subjective:   Mack Galloway is a 55 y.o. male who presents today for cardiac care and management in a heart failure clinic due to recent finding of LV dysfunction on echocardiogram which showed LVEF of 25%.  Patient does have a prior history of dilated cardiomyopathy for which he was cared for by Dr. Sarath Potter in the past.  Patient did have a coronary angiogram which showed nonobstructive CAD in 2015. His EF normalized however, patient stopped going to the doctor and stopped taking his medications.  He is now back with dilated cardiomyopathy.    Patient is feeling better these days. Does get winded upon walking up inclines or for distance. No symptoms at rest or with daily living activities.    I have personally interpreted her EKG today with patient, there is no evidence of acute coronary syndrome, no evidence of prior infarct, normal HI and QT interval, no significant conduction disease.    I have independently interpreted and reviewed blood tests results with patient in clinic which shows normal GFR, K of 4.2.      Past Medical History:   Diagnosis Date   • Acute systolic congestive heart failure, NYHA class 4 ([facility]) 10/29/2014   • Diabetes mellitus ([facility]) 10/29/2014    2007    • Hypertension    • Type II or unspecified type diabetes mellitus without mention of complication, not stated as uncontrolled      Past Surgical History:   Procedure Laterality Date   • RECOVERY  2/3/2015    Performed by Cath-Recovery Surgery at SURGERY SAME DAY [facility]   • APPENDECTOMY       Family History   Problem Relation Age of Onset   • Heart Disease Father         CAD   • Heart Disease Paternal Grandfather    • Diabetes Father      Social History     Socioeconomic History   • Marital status: Single     Spouse name: Not on file   • Number of children: Not on file   • Years of education: Not on file   • Highest education level:  Not on file   Occupational History   • Not on file   Social Needs   • Financial resource strain: Not on file   • Food insecurity     Worry: Not on file     Inability: Not on file   • Transportation needs     Medical: Not on file     Non-medical: Not on file   Tobacco Use   • Smoking status: Never Smoker   • Smokeless tobacco: Current User     Types: Chew   Substance and Sexual Activity   • Alcohol use: No   • Drug use: No   • Sexual activity: Not on file   Lifestyle   • Physical activity     Days per week: Not on file     Minutes per session: Not on file   • Stress: Not on file   Relationships   • Social connections     Talks on phone: Not on file     Gets together: Not on file     Attends [employer] service: Not on file     Active member of club or organization: Not on file     Attends meetings of clubs or organizations: Not on file     Relationship status: Not on file   • Intimate partner violence     Fear of current or ex partner: Not on file     Emotionally abused: Not on file     Physically abused: Not on file     Forced sexual activity: Not on file   Other Topics Concern   • Not on file   Social History Narrative   • Not on file     Allergies   Allergen Reactions   • Kiwi Extract Swelling     Outpatient Encounter Medications as of 12/9/2020   Medication Sig Dispense Refill   • sacubitril-valsartan (ENTRESTO)  MG Tab tablet Take 1 Tab by mouth 2 Times a Day. 60 Tab 11   • Dapagliflozin Propanediol (FARXIGA) 10 MG Tab Take 10 mg by mouth every day. 30 Tab 11   • potassium chloride (MICRO-K) 10 MEQ capsule      • carvedilol (COREG) 12.5 MG Tab Take 1 Tab by mouth 2 times a day, with meals. 60 Tab 11   • spironolactone (ALDACTONE) 25 MG Tab Take 1 Tab by mouth every day. 30 Tab 3   • furosemide (LASIX) 40 MG Tab Take 1 Tab by mouth every day. 30 Tab 11   • sildenafil citrate (VIAGRA) 50 MG tablet Take 2 Tabs by mouth as needed for Erectile Dysfunction. 20 Tab 0   • metformin ER (GLUCOPHAGE XR) 500 MG TABLET  "SR 24 HR Take 1 Tab by mouth 2 times a day. (Patient taking differently: Take 500 mg by mouth 2 times a day. 1000MG AM and 500MG PM= 1500 total daily dose) 60 Tab 11   • [DISCONTINUED] losartan (COZAAR) 100 MG Tab      • [DISCONTINUED] carvedilol (COREG) 3.125 MG Tab      • [DISCONTINUED] carvedilol (COREG) 6.25 MG Tab      • [DISCONTINUED] furosemide (LASIX) 20 MG Tab      • [DISCONTINUED] metFORMIN (GLUCOPHAGE) 500 MG Tab      • [DISCONTINUED] sacubitril-valsartan (ENTRESTO) 49-51 MG Tab tablet Take 1 Tab by mouth 2 Times a Day. 60 Tab 11     No facility-administered encounter medications on file as of 12/9/2020.      Review of Systems   Constitutional: Negative for diaphoresis and fever.   HENT: Negative for nosebleeds.    Eyes: Negative for blurred vision and double vision.   Respiratory: Positive for shortness of breath. Negative for cough.    Cardiovascular: Negative for chest pain and palpitations.   Gastrointestinal: Negative for abdominal pain.   Genitourinary: Negative for dysuria and frequency.   Musculoskeletal: Negative for falls and myalgias.   Skin: Negative for rash.   Neurological: Negative for dizziness, sensory change and headaches.   Endo/Heme/Allergies: Does not bruise/bleed easily.   Psychiatric/Behavioral: Negative for depression and memory loss.        Objective:   /98 (BP Location: Left arm, Patient Position: Sitting, BP Cuff Size: Adult)   Pulse 73   Resp 18   Ht 1.88 m (6' 2\")   Wt 94.3 kg (208 lb)   SpO2 95%   BMI 26.71 kg/m²     Physical Exam    Constitutional:   Well appearing, no acute distress  Heart: no pitting edema in BLE.  Lungs: no breathing distress, not tachypneic  Abdomen: no distention  Neurology: no signs of focal deficits.  Mentation is alert.    Assessment:     1. ACC/AHA stage C systolic heart failure (HCC)  Basic Metabolic Panel   2. Left ventricular systolic dysfunction, NYHA class 2  Basic Metabolic Panel   3. HTN (hypertension), malignant     4. " Non-ischemic cardiomyopathy (HCC)     5. Dyspnea on exertion     6. High risk medication use  Basic Metabolic Panel       Medical Decision Making:  Today's Assessment / Status / Plan:   Non Ischemic Cardiomyopathy:  Chronically illed condition which requires ongoing close monitoring and treatment to improve survival rate along with decreasing risk of clinical decompensation and hospitalization.    Today, based on physical examination findings, patient is euvolemic. No JVD, lungs are clear to auscultation, no pitting edema in bilateral lower extremities, no ascites.     Dry weight is 208 lbs. Lost 9 lbs.      Increase Entresto 97/103 mg twice day.    Will continue Carvedilol 12.5 mg po twice daily.    Continue Spironolactone 25 mg once a day.    Continue Furosemide 40 mg once a day.    Based on recent data on SGLT2 and heart failure with reduced ejection fraction, patient will be benefited from Farxiga 10 mg p.o. once a day for further reduction in mortality and hospitalization with absolute risk reduction of 4.9%.  Therefore, I will start patient on Farxiga 10 mg p.o. once a day.  Risks and benefits were explained to patient and patient has agreed to proceed.    Suspect HTN heart disease underlying as cause of dilated cardiomyopathy.    We will consider ICD placement for primary prevention in 3 months if her left ventricular systolic function remains less than 35% after optimization of her evidence based heart failure medical regimen.    Hypertension:  Blood pressure is better today.  Optimize control using cardiomyopathy medical regimen as well.

## 2020-12-10 NOTE — TELEPHONE ENCOUNTER
STEVIE SHORE (Key: [id number])   Rx #: [id number]   Farxiga 10MG tablets   Form  [facility] Medical Necessity Request Form   Plan Contact  [phone] phone   [phone] fax   Created   2 hours ago   Sent to Plan   less than a minute ago   Determination   Wait for Determination  Please wait for the payer to return a determination.

## 2020-12-10 NOTE — TELEPHONE ENCOUNTER
STEVIE SHORE (Key: [id number])   Rx #: [id number]   Entresto 97-103MG tablets   Form  [facility] Plan [facility] Medical Necessity Request Form   Plan Contact  [phone] phone   [phone] fax   Created   2 hours ago   Sent to Plan   less than a minute ago   Determination   Wait for Determination  Please wait for the payer to return a determination.

## 2020-12-17 ENCOUNTER — TELEPHONE (OUTPATIENT)
Dept: CARDIOLOGY | Facility: MEDICAL CENTER | Age: 55
End: 2020-12-17

## 2020-12-17 NOTE — TELEPHONE ENCOUNTER
YASIR Blanc from [facility] prior authorizations called with questions about sacubitril-valsartan (ENTRESTO)  MG Tab tablet. Please call back at [phone].     Thank you

## 2020-12-22 NOTE — TELEPHONE ENCOUNTER
STEVIE SHORE (Key: [id number])   Rx #: [id number]   Entresto 97-103MG tablets   Form  Health Plan [facility] Medical Necessity Request Form   Plan Contact  [phone] phone   [phone] fax   Created   12 days ago   Sent to Plan   12 days ago   Determination   Favorable   9 hours ago

## 2020-12-24 ENCOUNTER — TELEPHONE (OUTPATIENT)
Dept: CARDIOLOGY | Facility: MEDICAL CENTER | Age: 55
End: 2020-12-24

## 2020-12-24 NOTE — TELEPHONE ENCOUNTER
Chart Prep    LVM regarding standing lab work and gave call back number at [phone] and also left appt date and time at 12/29/20 at 4:15pm

## 2020-12-29 ENCOUNTER — OFFICE VISIT (OUTPATIENT)
Dept: CARDIOLOGY | Facility: MEDICAL CENTER | Age: 55
End: 2020-12-29
Payer: COMMERCIAL

## 2020-12-29 VITALS
BODY MASS INDEX: 27.08 KG/M2 | WEIGHT: 211 LBS | SYSTOLIC BLOOD PRESSURE: 144 MMHG | RESPIRATION RATE: 16 BRPM | HEIGHT: 74 IN | DIASTOLIC BLOOD PRESSURE: 100 MMHG | HEART RATE: 70 BPM | OXYGEN SATURATION: 94 %

## 2020-12-29 DIAGNOSIS — I51.89 LEFT VENTRICULAR SYSTOLIC DYSFUNCTION, NYHA CLASS 2: ICD-10-CM

## 2020-12-29 DIAGNOSIS — I10 HTN (HYPERTENSION), MALIGNANT: ICD-10-CM

## 2020-12-29 DIAGNOSIS — I50.20 ACC/AHA STAGE C SYSTOLIC HEART FAILURE (HCC): ICD-10-CM

## 2020-12-29 DIAGNOSIS — I42.8 NON-ISCHEMIC CARDIOMYOPATHY (HCC): ICD-10-CM

## 2020-12-29 DIAGNOSIS — Z79.899 HIGH RISK MEDICATION USE: ICD-10-CM

## 2020-12-29 DIAGNOSIS — R06.09 DYSPNEA ON EXERTION: ICD-10-CM

## 2020-12-29 PROCEDURE — 99215 OFFICE O/P EST HI 40 MIN: CPT | Performed by: INTERNAL MEDICINE

## 2020-12-29 RX ORDER — POTASSIUM CHLORIDE 20 MEQ/1
20 TABLET, EXTENDED RELEASE ORAL 2 TIMES DAILY
Qty: 60 TAB | Refills: 11 | Status: SHIPPED | OUTPATIENT
Start: 2020-12-29 | End: 2021-05-07

## 2020-12-29 RX ORDER — DAPAGLIFLOZIN 10 MG/1
10 TABLET, FILM COATED ORAL DAILY
Qty: 30 TAB | Refills: 11 | Status: SHIPPED | OUTPATIENT
Start: 2020-12-29 | End: 2021-03-31

## 2020-12-29 RX ORDER — SACUBITRIL AND VALSARTAN 97; 103 MG/1; MG/1
1 TABLET, FILM COATED ORAL 2 TIMES DAILY
Qty: 60 TAB | Refills: 11 | Status: SHIPPED | OUTPATIENT
Start: 2020-12-29 | End: 2021-05-07

## 2020-12-29 RX ORDER — CARVEDILOL 25 MG/1
25 TABLET ORAL 2 TIMES DAILY WITH MEALS
Qty: 60 TAB | Refills: 11 | Status: SHIPPED | OUTPATIENT
Start: 2020-12-29 | End: 2022-01-27 | Stop reason: SDUPTHER

## 2020-12-29 RX ORDER — FUROSEMIDE 40 MG/1
40 TABLET ORAL
Qty: 60 TAB | Refills: 11 | Status: SHIPPED | OUTPATIENT
Start: 2020-12-29 | End: 2021-05-07

## 2020-12-29 ASSESSMENT — ENCOUNTER SYMPTOMS
COUGH: 0
FALLS: 0
MYALGIAS: 0
SHORTNESS OF BREATH: 1
MEMORY LOSS: 0
BRUISES/BLEEDS EASILY: 0
DOUBLE VISION: 0
DEPRESSION: 0
FEVER: 0
HEADACHES: 0
ABDOMINAL PAIN: 0
SENSORY CHANGE: 0
DIAPHORESIS: 0
DIZZINESS: 0
PALPITATIONS: 0
BLURRED VISION: 0

## 2020-12-30 NOTE — PROGRESS NOTES
Chief Complaint   Patient presents with   • Congestive Heart Failure       Subjective:   Mack Galloway is a 55 y.o. male who presents today for cardiac care and management in a heart failure clinic due to recent finding of LV dysfunction on echocardiogram which showed LVEF of 25%.  Patient does have a prior history of dilated cardiomyopathy for which he was cared for by Dr. Sarath Potter in the past.  Patient did have a coronary angiogram which showed nonobstructive CAD in 2015. His EF normalized however, patient stopped going to the doctor and stopped taking his medications.  He is now back with dilated cardiomyopathy.    Patient is feeling better these days. Does get winded upon walking up inclines or for distance. No symptoms at rest or with daily living activities.    I have personally interpreted her EKG today with patient, there is no evidence of acute coronary syndrome, no evidence of prior infarct, normal PA and QT interval, no significant conduction disease.    I have independently interpreted and reviewed blood tests results with patient in clinic which shows normal GFR, K of 4.8.      Past Medical History:   Diagnosis Date   • Acute systolic congestive heart failure, NYHA class 4 ([facility]) 10/29/2014   • Diabetes mellitus ([facility]) 10/29/2014    2007    • Hypertension    • Type II or unspecified type diabetes mellitus without mention of complication, not stated as uncontrolled      Past Surgical History:   Procedure Laterality Date   • RECOVERY  2/3/2015    Performed by Cath-Recovery Surgery at SURGERY SAME DAY [facility]   • APPENDECTOMY       Family History   Problem Relation Age of Onset   • Heart Disease Father         CAD   • Diabetes Father    • Heart Disease Paternal Grandfather      Social History     Socioeconomic History   • Marital status: Single     Spouse name: Not on file   • Number of children: Not on file   • Years of education: Not on file   • Highest education level: Not on file    Occupational History   • Not on file   Social Needs   • Financial resource strain: Not on file   • Food insecurity     Worry: Not on file     Inability: Not on file   • Transportation needs     Medical: Not on file     Non-medical: Not on file   Tobacco Use   • Smoking status: Never Smoker   • Smokeless tobacco: Current User     Types: Chew   Substance and Sexual Activity   • Alcohol use: No   • Drug use: No   • Sexual activity: Not on file   Lifestyle   • Physical activity     Days per week: Not on file     Minutes per session: Not on file   • Stress: Not on file   Relationships   • Social connections     Talks on phone: Not on file     Gets together: Not on file     Attends [employer] service: Not on file     Active member of club or organization: Not on file     Attends meetings of clubs or organizations: Not on file     Relationship status: Not on file   • Intimate partner violence     Fear of current or ex partner: Not on file     Emotionally abused: Not on file     Physically abused: Not on file     Forced sexual activity: Not on file   Other Topics Concern   • Not on file   Social History Narrative   • Not on file     Allergies   Allergen Reactions   • Kiwi Extract Swelling     Outpatient Encounter Medications as of 12/29/2020   Medication Sig Dispense Refill   • potassium chloride (MICRO-K) 10 MEQ capsule      • carvedilol (COREG) 12.5 MG Tab Take 1 Tab by mouth 2 times a day, with meals. 60 Tab 11   • spironolactone (ALDACTONE) 25 MG Tab Take 1 Tab by mouth every day. 30 Tab 3   • furosemide (LASIX) 40 MG Tab Take 1 Tab by mouth every day. 30 Tab 11   • sildenafil citrate (VIAGRA) 50 MG tablet Take 2 Tabs by mouth as needed for Erectile Dysfunction. 20 Tab 0   • metformin ER (GLUCOPHAGE XR) 500 MG TABLET SR 24 HR Take 1 Tab by mouth 2 times a day. (Patient taking differently: Take 500 mg by mouth 2 times a day. 1000MG AM and 500MG PM= 1500 total daily dose) 60 Tab 11   • sacubitril-valsartan (ENTRESTO)  " MG Tab tablet Take 1 Tab by mouth 2 Times a Day. (Patient not taking: Reported on 12/29/2020) 60 Tab 11   • Dapagliflozin Propanediol (FARXIGA) 10 MG Tab Take 10 mg by mouth every day. (Patient not taking: Reported on 12/29/2020) 30 Tab 11     No facility-administered encounter medications on file as of 12/29/2020.      Review of Systems   Constitutional: Negative for diaphoresis and fever.   HENT: Negative for nosebleeds.    Eyes: Negative for blurred vision and double vision.   Respiratory: Positive for shortness of breath. Negative for cough.    Cardiovascular: Negative for chest pain and palpitations.   Gastrointestinal: Negative for abdominal pain.   Genitourinary: Negative for dysuria and frequency.   Musculoskeletal: Negative for falls and myalgias.   Skin: Negative for rash.   Neurological: Negative for dizziness, sensory change and headaches.   Endo/Heme/Allergies: Does not bruise/bleed easily.   Psychiatric/Behavioral: Negative for depression and memory loss.        Objective:   /100 (BP Location: Right arm, Patient Position: Sitting, BP Cuff Size: Adult)   Pulse 70   Resp 16   Ht 1.88 m (6' 2\")   Wt 95.7 kg (211 lb)   SpO2 94%   BMI 27.09 kg/m²     Physical Exam   Constitutional: He is oriented to person, place, and time. No distress.   HENT:   Head: Normocephalic and atraumatic.   Right Ear: External ear normal.   Left Ear: External ear normal.   Eyes: Right eye exhibits no discharge. Left eye exhibits no discharge.   Neck: No JVD present. No thyromegaly present.   Cardiovascular: Normal rate, regular rhythm and intact distal pulses.   Ausculation was not performed to minimize the risk of COVID spread during current pandemic. This complies with Medicare policies and guidelines.     Pulmonary/Chest: No respiratory distress.   Abdominal: He exhibits no distension. There is no abdominal tenderness.   Musculoskeletal:         General: No edema.   Neurological: He is alert and oriented to " person, place, and time. No cranial nerve deficit.   Skin: Skin is warm and dry. He is not diaphoretic.   Psychiatric: He has a normal mood and affect. His behavior is normal.   Nursing note and vitals reviewed.      Assessment:     1. ACC/AHA stage C systolic heart failure (HCC)     2. Left ventricular systolic dysfunction, NYHA class 2     3. HTN (hypertension), malignant     4. Non-ischemic cardiomyopathy (HCC)     5. Dyspnea on exertion     6. High risk medication use         Medical Decision Making:  Today's Assessment / Status / Plan:   Non Ischemic Cardiomyopathy:  Chronically illed condition which requires ongoing close monitoring and treatment to improve survival rate along with decreasing risk of clinical decompensation and hospitalization.    Today, based on physical examination findings, patient is euvolemic. No JVD, lungs are clear to auscultation, no pitting edema in bilateral lower extremities, no ascites.      Dry weight is 211 lbs.      Increase Entresto 97/103 mg twice day.    Will increase Carvedilol to 25 mg po twice daily.    Continue Spironolactone 25 mg once a day.    Will change Furosemide to 40 mg twice a day, morning and noon.    Based on recent data on SGLT2 and heart failure with reduced ejection fraction, patient will be benefited from Farxiga 10 mg p.o. once a day for further reduction in mortality and hospitalization with absolute risk reduction of 4.9%.  Therefore, I will start patient on Farxiga 10 mg p.o. once a day.  Risks and benefits were explained to patient and patient has agreed to proceed.    Suspect HTN heart disease underlying as cause of dilated cardiomyopathy.    We will consider ICD placement for primary prevention in 3 months if her left ventricular systolic function remains less than 35% after optimization of her evidence based heart failure medical regimen.    Hypertension:  Blood pressure is better today but still high.  Optimize control using cardiomyopathy medical  regimen as well.

## 2020-12-30 NOTE — PATIENT INSTRUCTIONS
Will increase Carvedilol to 25 mg po twice daily.    Will change Furosemide to 40 mg twice a day, morning and noon.

## 2021-01-04 NOTE — TELEPHONE ENCOUNTER
Called patient plan trying to find out if PA was approved or denied, spoke with Jah who tells me PA for Farxiga was denied due to patient not having met tried and failed guidelines for plans formulary medications. Asked jah to please fax me a copy of denial letter, set to come to [phone] right fax.

## 2021-02-08 ENCOUNTER — TELEPHONE (OUTPATIENT)
Dept: CARDIOLOGY | Facility: MEDICAL CENTER | Age: 56
End: 2021-02-08

## 2021-02-09 NOTE — TELEPHONE ENCOUNTER
YASIR Arteaga from [facility] called with questions regarding Pts Entresto. Please call Jenni back at [phone].

## 2021-02-09 NOTE — TELEPHONE ENCOUNTER
Returned call to [facility]. They state pt's co pay for Entresto is over $500 as it was moved to a different tier.     To Adenike, would it be possible for you to look into a tier exception/reduction?

## 2021-02-16 NOTE — TELEPHONE ENCOUNTER
Adenike Mccormack, Med Ass't  You 4 days ago     Called [place] [facility] spoke to Kaycee, who tells me the patient has not met his deductible for his copay's which is why the med is costing so much.     Tried to call the patient, couldn't get through with no option to [facility]   Applied for a voucher for the patient, got approved. Voucher good for 30 day free trial   Called Kaycee back gave her Rx info off the voucher. Kaycee to run and call us back, call back number provided     Message text

## 2021-03-15 DIAGNOSIS — I50.22 CHRONIC SYSTOLIC CONGESTIVE HEART FAILURE, NYHA CLASS 3 (HCC): ICD-10-CM

## 2021-03-16 RX ORDER — SPIRONOLACTONE 25 MG/1
25 TABLET ORAL DAILY
Qty: 90 TABLET | Refills: 3 | Status: SHIPPED | OUTPATIENT
Start: 2021-03-16 | End: 2022-01-27 | Stop reason: SDUPTHER

## 2021-03-24 ENCOUNTER — HOSPITAL ENCOUNTER (OUTPATIENT)
Dept: CARDIOLOGY | Facility: MEDICAL CENTER | Age: 56
End: 2021-03-24
Attending: INTERNAL MEDICINE
Payer: COMMERCIAL

## 2021-03-24 DIAGNOSIS — I50.20 ACC/AHA STAGE C SYSTOLIC HEART FAILURE (HCC): ICD-10-CM

## 2021-03-24 DIAGNOSIS — I42.8 NON-ISCHEMIC CARDIOMYOPATHY (HCC): ICD-10-CM

## 2021-03-24 DIAGNOSIS — I10 HTN (HYPERTENSION), MALIGNANT: ICD-10-CM

## 2021-03-24 DIAGNOSIS — I51.89 LEFT VENTRICULAR SYSTOLIC DYSFUNCTION, NYHA CLASS 2: ICD-10-CM

## 2021-03-24 DIAGNOSIS — R06.09 DYSPNEA ON EXERTION: ICD-10-CM

## 2021-03-24 LAB
LV EJECT FRACT  99904: 40
LV EJECT FRACT MOD 2C 99903: 44.97
LV EJECT FRACT MOD 4C 99902: 34.94
LV EJECT FRACT MOD BP 99901: 39.79

## 2021-03-24 PROCEDURE — 93325 DOPPLER ECHO COLOR FLOW MAPG: CPT | Mod: 26 | Performed by: INTERNAL MEDICINE

## 2021-03-24 PROCEDURE — 93325 DOPPLER ECHO COLOR FLOW MAPG: CPT

## 2021-03-24 PROCEDURE — 93308 TTE F-UP OR LMTD: CPT | Mod: 26 | Performed by: INTERNAL MEDICINE

## 2021-03-24 PROCEDURE — 93321 DOPPLER ECHO F-UP/LMTD STD: CPT | Mod: 26 | Performed by: INTERNAL MEDICINE

## 2021-03-25 NOTE — RESULT ENCOUNTER NOTE
Dear Jaki,    Can you please let Mack Galloway know that result is not entirely normal and I will see patient sooner to discuss?    Thank you,  Alfonso.

## 2021-03-31 ENCOUNTER — OFFICE VISIT (OUTPATIENT)
Dept: CARDIOLOGY | Facility: MEDICAL CENTER | Age: 56
End: 2021-03-31
Payer: COMMERCIAL

## 2021-03-31 VITALS
HEIGHT: 74 IN | SYSTOLIC BLOOD PRESSURE: 164 MMHG | DIASTOLIC BLOOD PRESSURE: 88 MMHG | OXYGEN SATURATION: 100 % | HEART RATE: 70 BPM | BODY MASS INDEX: 26.05 KG/M2 | WEIGHT: 203 LBS

## 2021-03-31 DIAGNOSIS — I51.89 LEFT VENTRICULAR SYSTOLIC DYSFUNCTION, NYHA CLASS 2: ICD-10-CM

## 2021-03-31 DIAGNOSIS — I42.8 NON-ISCHEMIC CARDIOMYOPATHY (HCC): ICD-10-CM

## 2021-03-31 DIAGNOSIS — I10 HTN (HYPERTENSION), MALIGNANT: ICD-10-CM

## 2021-03-31 DIAGNOSIS — I50.22 CHRONIC SYSTOLIC CONGESTIVE HEART FAILURE, NYHA CLASS 3 (HCC): ICD-10-CM

## 2021-03-31 DIAGNOSIS — R06.09 DYSPNEA ON EXERTION: ICD-10-CM

## 2021-03-31 DIAGNOSIS — I50.20 ACC/AHA STAGE C SYSTOLIC HEART FAILURE (HCC): ICD-10-CM

## 2021-03-31 DIAGNOSIS — Z79.899 HIGH RISK MEDICATION USE: ICD-10-CM

## 2021-03-31 PROCEDURE — 99214 OFFICE O/P EST MOD 30 MIN: CPT | Performed by: INTERNAL MEDICINE

## 2021-03-31 RX ORDER — EMPAGLIFLOZIN 10 MG/1
10 TABLET, FILM COATED ORAL DAILY
Qty: 30 TABLET | Refills: 11 | Status: SHIPPED | OUTPATIENT
Start: 2021-03-31 | End: 2022-01-27 | Stop reason: SDUPTHER

## 2021-03-31 RX ORDER — METFORMIN HYDROCHLORIDE 500 MG/1
1500 TABLET, EXTENDED RELEASE ORAL DAILY
COMMUNITY
End: 2023-08-29

## 2021-03-31 RX ORDER — HYDRALAZINE HYDROCHLORIDE 50 MG/1
50 TABLET, FILM COATED ORAL 2 TIMES DAILY
Qty: 60 TABLET | Refills: 11 | Status: SHIPPED | OUTPATIENT
Start: 2021-03-31 | End: 2021-05-07

## 2021-03-31 ASSESSMENT — MINNESOTA LIVING WITH HEART FAILURE QUESTIONNAIRE (MLHF)
DIFFICULTY WITH RECREATIONAL PASTIMES, SPORTS, HOBBIES: 1
TOTAL_SCORE: 12
DIFFICULTY SLEEPING WELL AT NIGHT: 0
DIFFICULTY WITH SEXUAL ACTIVITIES: 2
MAKING YOU WORRY: 1
HAVING TO SIT OR LIE DOWN DURING THE DAY: 1
MAKING YOU FEEL DEPRESSED: 0
WORKING AROUND THE HOUSE OR YARD DIFFICULT: 1
MAKING YOU STAY IN A HOSPITAL: 0
DIFFICULTY WORKING TO EARN A LIVING: 0
SWELLING IN ANKLES OR LEGS: 1
COSTING YOU MONEY FOR MEDICAL CARE: 0
LOSS OF SELF CONTROL IN YOUR LIFE: 0
GIVING YOU SIDE EFFECTS FROM TREATMENTS: 1
DIFFICULTY GOING AWAY FROM HOME: 0
DIFFICULTY TO CONCENTRATE OR REMEMBERING THINGS: 0
WALKING ABOUT OR CLIMBING STAIRS DIFFICULT: 1
MAKING YOU SHORT OF BREATH: 2
DIFFICULTY SOCIALIZING WITH FAMILY OR FRIENDS: 0
EATING LESS FOODS YOU LIKE: 0
TIRED, FATIGUED OR LOW ON ENERGY: 1
FEELING LIKE A BURDEN TO FAMILY AND FRIENDS: 0

## 2021-03-31 ASSESSMENT — ENCOUNTER SYMPTOMS
FEVER: 0
SHORTNESS OF BREATH: 1
COUGH: 0
BLURRED VISION: 0
MEMORY LOSS: 0
DEPRESSION: 0
SENSORY CHANGE: 0
FALLS: 0
MYALGIAS: 0
DIAPHORESIS: 0
DIZZINESS: 0
DOUBLE VISION: 0
PALPITATIONS: 0
HEADACHES: 0
BRUISES/BLEEDS EASILY: 0
ABDOMINAL PAIN: 0

## 2021-03-31 NOTE — PROGRESS NOTES
Chief Complaint   Patient presents with   • HTN (Controlled)     follow up echo results       Subjective:   Mack Galloway is a 55 y.o. male who presents today for cardiac care and management in a heart failure clinic due to recent finding of LV dysfunction on echocardiogram which showed LVEF of 25%.  Patient does have a prior history of dilated cardiomyopathy for which he was cared for by Dr. Sarath Potter in the past.  Patient did have a coronary angiogram which showed nonobstructive CAD in 2015. His EF normalized however, patient stopped going to the doctor and stopped taking his medications.  He is now back with dilated cardiomyopathy.    Patient is feeling better these days. Does get winded upon walking up inclines or for distance. No symptoms at rest or with daily living activities.    I have personally interpreted her EKG today with patient, there is no evidence of acute coronary syndrome, no evidence of prior infarct, normal IA and QT interval, no significant conduction disease.    I have independently interpreted and reviewed blood tests results with patient in clinic which shows normal GFR, K of 4.8.    LVEF of 40% with global hypokinesis. I have independently interpreted and reviewed echocardiogram's actual images. 03/2021.    Insurance did not approve Farxiga.    Past Medical History:   Diagnosis Date   • Acute systolic congestive heart failure, NYHA class 4 ([facility]) 10/29/2014   • Diabetes mellitus ([facility]) 10/29/2014    2007    • Hypertension    • Type II or unspecified type diabetes mellitus without mention of complication, not stated as uncontrolled      Past Surgical History:   Procedure Laterality Date   • RECOVERY  2/3/2015    Performed by Cath-Recovery Surgery at SURGERY SAME DAY [facility] ORS   • APPENDECTOMY       Family History   Problem Relation Age of Onset   • Heart Disease Father         CAD   • Diabetes Father    • Heart Disease Paternal Grandfather      Social History     Socioeconomic  History   • Marital status: Single     Spouse name: Not on file   • Number of children: Not on file   • Years of education: Not on file   • Highest education level: Not on file   Occupational History   • Not on file   Tobacco Use   • Smoking status: Never Smoker   • Smokeless tobacco: Current User     Types: Chew   Substance and Sexual Activity   • Alcohol use: No   • Drug use: No   • Sexual activity: Not on file   Other Topics Concern   • Not on file   Social History Narrative   • Not on file     Social Determinants of Health     Financial Resource Strain:    • Difficulty of Paying Living Expenses:    Food Insecurity:    • Worried About Running Out of Food in the Last Year:    • Ran Out of Food in the Last Year:    Transportation Needs:    • Lack of Transportation (Medical):    • Lack of Transportation (Non-Medical):    Physical Activity:    • Days of Exercise per Week:    • Minutes of Exercise per Session:    Stress:    • Feeling of Stress :    Social Connections:    • Frequency of Communication with Friends and Family:    • Frequency of Social Gatherings with Friends and Family:    • Attends Yazidi Services:    • Active Member of Clubs or Organizations:    • Attends Club or Organization Meetings:    • Marital Status:    Intimate Partner Violence:    • Fear of Current or Ex-Partner:    • Emotionally Abused:    • Physically Abused:    • Sexually Abused:      Allergies   Allergen Reactions   • Kiwi Extract Swelling     Outpatient Encounter Medications as of 3/31/2021   Medication Sig Dispense Refill   • metFORMIN ER (GLUCOPHAGE XR) 500 MG TABLET SR 24 HR Take 1,500 mg by mouth every day. 1000 mg  mg PM     • hydrALAZINE (APRESOLINE) 50 MG Tab Take 1 tablet by mouth 2 Times a Day. 60 tablet 11   • Empagliflozin (JARDIANCE) 10 MG Tab Take 10 mg by mouth every day. 30 tablet 11   • spironolactone (ALDACTONE) 25 MG Tab Take 1 tablet by mouth every day. 90 tablet 3   • sacubitril-valsartan (ENTRESTO)  MG  "Tab tablet Take 1 Tab by mouth 2 Times a Day. 60 Tab 11   • carvedilol (COREG) 25 MG Tab Take 1 Tab by mouth 2 times a day, with meals. 60 Tab 11   • furosemide (LASIX) 40 MG Tab Take 1 Tab by mouth 2 (two) times a day. 60 Tab 11   • potassium chloride SA (KDUR) 20 MEQ Tab CR Take 1 Tab by mouth 2 times a day. 60 Tab 11   • sildenafil citrate (VIAGRA) 50 MG tablet Take 2 Tabs by mouth as needed for Erectile Dysfunction. 20 Tab 0   • [DISCONTINUED] Dapagliflozin Propanediol (FARXIGA) 10 MG Tab Take 10 mg by mouth every day. (Patient not taking: Reported on 3/31/2021) 30 Tab 11   • [DISCONTINUED] potassium chloride (MICRO-K) 10 MEQ capsule      • [DISCONTINUED] metformin ER (GLUCOPHAGE XR) 500 MG TABLET SR 24 HR Take 1 Tab by mouth 2 times a day. (Patient not taking: Reported on 3/31/2021) 60 Tab 11     No facility-administered encounter medications on file as of 3/31/2021.     Review of Systems   Constitutional: Negative for diaphoresis and fever.   HENT: Negative for nosebleeds.    Eyes: Negative for blurred vision and double vision.   Respiratory: Positive for shortness of breath. Negative for cough.    Cardiovascular: Negative for chest pain and palpitations.   Gastrointestinal: Negative for abdominal pain.   Genitourinary: Negative for dysuria and frequency.   Musculoskeletal: Negative for falls and myalgias.   Skin: Negative for rash.   Neurological: Negative for dizziness, sensory change and headaches.   Endo/Heme/Allergies: Does not bruise/bleed easily.   Psychiatric/Behavioral: Negative for depression and memory loss.        Objective:   BP (!) 164/88 (BP Location: Left arm, Patient Position: Sitting)   Pulse 70   Ht 1.88 m (6' 2\")   Wt 92.1 kg (203 lb)   SpO2 100%   BMI 26.06 kg/m²     Physical Exam   Constitutional: He is oriented to person, place, and time. No distress.   HENT:   Head: Normocephalic and atraumatic.   Right Ear: External ear normal.   Left Ear: External ear normal.   Eyes: Right eye " exhibits no discharge. Left eye exhibits no discharge.   Neck: No JVD present. No thyromegaly present.   Cardiovascular: Normal rate, regular rhythm and intact distal pulses.   Ausculation was not performed to minimize the risk of COVID spread during current pandemic. This complies with Medicare policies and guidelines.     Pulmonary/Chest: No respiratory distress.   Abdominal: He exhibits no distension. There is no abdominal tenderness.   Musculoskeletal:         General: No edema.   Neurological: He is alert and oriented to person, place, and time. No cranial nerve deficit.   Skin: Skin is warm and dry. He is not diaphoretic.   Psychiatric: He has a normal mood and affect. His behavior is normal.   Nursing note and vitals reviewed.      Assessment:     1. ACC/AHA stage C systolic heart failure (HCC)  Basic Metabolic Panel   2. Left ventricular systolic dysfunction, NYHA class 2  Basic Metabolic Panel   3. HTN (hypertension), malignant     4. Non-ischemic cardiomyopathy (HCC)     5. Dyspnea on exertion     6. Chronic systolic congestive heart failure, NYHA class 3 (HCC)     7. High risk medication use  Basic Metabolic Panel       Medical Decision Making:  Today's Assessment / Status / Plan:   Non Ischemic Cardiomyopathy:  Chronically illed condition which requires ongoing close monitoring and treatment to improve survival rate along with decreasing risk of clinical decompensation and hospitalization.    Today, based on physical examination findings, patient is euvolemic. No JVD, lungs are clear to auscultation, no pitting edema in bilateral lower extremities, no ascites.      Dry weight is 203 lbs. Lost 8 lbs.      Continue Entresto 97/103 mg twice day.    Will continue Carvedilol to 25 mg po twice daily.    Continue Spironolactone 25 mg once a day.    Will continue Furosemide to 40 mg twice a day, morning and noon.    Based on recent data on SGLT2 and heart failure with reduced ejection fraction, patient will be  benefited from Farxiga 10 mg p.o. once a day for further reduction in mortality and hospitalization with absolute risk reduction of 4.9%.  Therefore, I will start patient on [id number] mg p.o. once a day class effect, due to insurance issue.  Risks and benefits were explained to patient and patient has agreed to proceed.    Suspect HTN heart disease underlying as cause of dilated cardiomyopathy.    No ICD placement for primary prevention as LVEF is now 40%.    Hypertension:  Blood pressure is still high.  Will add Hydralazine 50 mg bid.  Optimize control using cardiomyopathy medical regimen as well.

## 2021-05-06 DIAGNOSIS — Z79.899 HIGH RISK MEDICATION USE: ICD-10-CM

## 2021-05-07 ENCOUNTER — OFFICE VISIT (OUTPATIENT)
Dept: CARDIOLOGY | Facility: MEDICAL CENTER | Age: 56
End: 2021-05-07
Payer: COMMERCIAL

## 2021-05-07 ENCOUNTER — TELEPHONE (OUTPATIENT)
Dept: CARDIOLOGY | Facility: MEDICAL CENTER | Age: 56
End: 2021-05-07

## 2021-05-07 VITALS
OXYGEN SATURATION: 97 % | WEIGHT: 205 LBS | BODY MASS INDEX: 26.31 KG/M2 | RESPIRATION RATE: 16 BRPM | SYSTOLIC BLOOD PRESSURE: 112 MMHG | HEART RATE: 81 BPM | HEIGHT: 74 IN | DIASTOLIC BLOOD PRESSURE: 70 MMHG

## 2021-05-07 DIAGNOSIS — R06.09 DYSPNEA ON EXERTION: ICD-10-CM

## 2021-05-07 DIAGNOSIS — I10 HTN (HYPERTENSION), MALIGNANT: ICD-10-CM

## 2021-05-07 DIAGNOSIS — Z79.899 HIGH RISK MEDICATION USE: ICD-10-CM

## 2021-05-07 DIAGNOSIS — I51.89 LEFT VENTRICULAR SYSTOLIC DYSFUNCTION, NYHA CLASS 2: ICD-10-CM

## 2021-05-07 DIAGNOSIS — I50.22 CHRONIC SYSTOLIC CONGESTIVE HEART FAILURE, NYHA CLASS 3 (HCC): ICD-10-CM

## 2021-05-07 DIAGNOSIS — I50.20 ACC/AHA STAGE C SYSTOLIC HEART FAILURE (HCC): ICD-10-CM

## 2021-05-07 DIAGNOSIS — I42.8 NON-ISCHEMIC CARDIOMYOPATHY (HCC): ICD-10-CM

## 2021-05-07 PROCEDURE — 99214 OFFICE O/P EST MOD 30 MIN: CPT | Performed by: INTERNAL MEDICINE

## 2021-05-07 RX ORDER — FUROSEMIDE 40 MG/1
40 TABLET ORAL SEE ADMIN INSTRUCTIONS
Qty: 30 TABLET | Refills: 11 | Status: SHIPPED | OUTPATIENT
Start: 2021-05-07

## 2021-05-07 RX ORDER — SACUBITRIL AND VALSARTAN 97; 103 MG/1; MG/1
1 TABLET, FILM COATED ORAL 2 TIMES DAILY
Qty: 60 TABLET | Refills: 11 | Status: SHIPPED | OUTPATIENT
Start: 2021-05-07 | End: 2022-01-26

## 2021-05-07 ASSESSMENT — ENCOUNTER SYMPTOMS
MEMORY LOSS: 0
BLURRED VISION: 0
FEVER: 0
HEADACHES: 0
DEPRESSION: 0
DIAPHORESIS: 0
DOUBLE VISION: 0
ABDOMINAL PAIN: 0
SENSORY CHANGE: 0
COUGH: 0
PALPITATIONS: 0
BRUISES/BLEEDS EASILY: 0
DIZZINESS: 0
FALLS: 0
MYALGIAS: 0
SHORTNESS OF BREATH: 1

## 2021-05-07 NOTE — PROGRESS NOTES
Chief Complaint   Patient presents with   • Congestive Heart Failure     ACC/AHA stage C systolic heart failure (HCC)       Subjective:   Mack Galloway is a 55 y.o. male who presents today for cardiac care and management in a heart failure clinic due to recent finding of LV dysfunction on echocardiogram which showed LVEF of 25%.  Patient does have a prior history of dilated cardiomyopathy for which he was cared for by Dr. Sarath Potter in the past.  Patient did have a coronary angiogram which showed nonobstructive CAD in 2015. His EF normalized however, patient stopped going to the doctor and stopped taking his medications.  He is now back with dilated cardiomyopathy.    Patient is feeling better these days. Does get winded upon walking up inclines or for distance. No symptoms at rest or with daily living activities.    I have personally interpreted her EKG today with patient, there is no evidence of acute coronary syndrome, no evidence of prior infarct, normal TN and QT interval, no significant conduction disease.    I have independently interpreted and reviewed blood tests results with patient in clinic which shows normal GFR, K of 4.8.    LVEF of 40% with global hypokinesis. I have independently interpreted and reviewed echocardiogram's actual images. 03/2021.    Insurance did not approve Farxiga. On Jardiance now.    Did get a lot of lightheadedness.    Past Medical History:   Diagnosis Date   • Acute systolic congestive heart failure, NYHA class 4 (HCC) 10/29/2014   • Diabetes mellitus (HCC) 10/29/2014    2007    • Hypertension    • Type II or unspecified type diabetes mellitus without mention of complication, not stated as uncontrolled      Past Surgical History:   Procedure Laterality Date   • RECOVERY  2/3/2015    Performed by Cath-Recovery Surgery at SURGERY SAME DAY [facility] ORS   • APPENDECTOMY       Family History   Problem Relation Age of Onset   • Heart Disease Father         CAD   • Diabetes  Father    • Heart Disease Paternal Grandfather      Social History     Socioeconomic History   • Marital status: Single     Spouse name: Not on file   • Number of children: Not on file   • Years of education: Not on file   • Highest education level: Not on file   Occupational History   • Not on file   Tobacco Use   • Smoking status: Never Smoker   • Smokeless tobacco: Current User     Types: Chew   Substance and Sexual Activity   • Alcohol use: No   • Drug use: No   • Sexual activity: Not on file   Other Topics Concern   • Not on file   Social History Narrative   • Not on file     Social Determinants of Health     Financial Resource Strain:    • Difficulty of Paying Living Expenses:    Food Insecurity:    • Worried About Running Out of Food in the Last Year:    • Ran Out of Food in the Last Year:    Transportation Needs:    • Lack of Transportation (Medical):    • Lack of Transportation (Non-Medical):    Physical Activity:    • Days of Exercise per Week:    • Minutes of Exercise per Session:    Stress:    • Feeling of Stress :    Social Connections:    • Frequency of Communication with Friends and Family:    • Frequency of Social Gatherings with Friends and Family:    • Attends Mandaeism Services:    • Active Member of Clubs or Organizations:    • Attends Club or Organization Meetings:    • Marital Status:    Intimate Partner Violence:    • Fear of Current or Ex-Partner:    • Emotionally Abused:    • Physically Abused:    • Sexually Abused:      Allergies   Allergen Reactions   • Kiwi Extract Swelling     Outpatient Encounter Medications as of 5/7/2021   Medication Sig Dispense Refill   • metFORMIN ER (GLUCOPHAGE XR) 500 MG TABLET SR 24 HR Take 1,500 mg by mouth every day. 1000 mg  mg PM     • hydrALAZINE (APRESOLINE) 50 MG Tab Take 1 tablet by mouth 2 Times a Day. (Patient taking differently: Take 50 mg by mouth 2 times a day. Pt taking one tabs every 4-5 days) 60 tablet 11   • Empagliflozin (JARDIANCE) 10 MG  "Tab Take 10 mg by mouth every day. 30 tablet 11   • spironolactone (ALDACTONE) 25 MG Tab Take 1 tablet by mouth every day. 90 tablet 3   • carvedilol (COREG) 25 MG Tab Take 1 Tab by mouth 2 times a day, with meals. 60 Tab 11   • furosemide (LASIX) 40 MG Tab Take 1 Tab by mouth 2 (two) times a day. 60 Tab 11   • potassium chloride SA (KDUR) 20 MEQ Tab CR Take 1 Tab by mouth 2 times a day. 60 Tab 11   • sildenafil citrate (VIAGRA) 50 MG tablet Take 2 Tabs by mouth as needed for Erectile Dysfunction. 20 Tab 0   • sacubitril-valsartan (ENTRESTO)  MG Tab tablet Take 1 Tab by mouth 2 Times a Day. (Patient not taking: Reported on 5/7/2021) 60 Tab 11     No facility-administered encounter medications on file as of 5/7/2021.     Review of Systems   Constitutional: Negative for diaphoresis and fever.   HENT: Negative for nosebleeds.    Eyes: Negative for blurred vision and double vision.   Respiratory: Positive for shortness of breath. Negative for cough.    Cardiovascular: Negative for chest pain and palpitations.   Gastrointestinal: Negative for abdominal pain.   Genitourinary: Negative for dysuria and frequency.   Musculoskeletal: Negative for falls and myalgias.   Skin: Negative for rash.   Neurological: Negative for dizziness, sensory change and headaches.   Endo/Heme/Allergies: Does not bruise/bleed easily.   Psychiatric/Behavioral: Negative for depression and memory loss.        Objective:   /70 (BP Location: Left arm, Patient Position: Sitting, BP Cuff Size: Adult)   Pulse 81   Resp 16   Ht 1.88 m (6' 2\")   Wt 93 kg (205 lb)   SpO2 97%   BMI 26.32 kg/m²     Physical Exam   Constitutional: He is oriented to person, place, and time. No distress.   HENT:   Head: Normocephalic and atraumatic.   Right Ear: External ear normal.   Left Ear: External ear normal.   Eyes: Right eye exhibits no discharge. Left eye exhibits no discharge.   Neck: No JVD present. No thyromegaly present.   Cardiovascular: Normal " rate, regular rhythm and intact distal pulses.   Ausculation was not performed to minimize the risk of COVID spread during current pandemic. This complies with Medicare policies and guidelines.     Pulmonary/Chest: No respiratory distress.   Abdominal: He exhibits no distension. There is no abdominal tenderness.   Musculoskeletal:         General: No edema.   Neurological: He is alert and oriented to person, place, and time. No cranial nerve deficit.   Skin: Skin is warm and dry. He is not diaphoretic.   Psychiatric: He has a normal mood and affect. His behavior is normal.   Nursing note and vitals reviewed.      Assessment:     1. ACC/AHA stage C systolic heart failure (HCC)     2. Left ventricular systolic dysfunction, NYHA class 2     3. HTN (hypertension), malignant     4. Non-ischemic cardiomyopathy (HCC)     5. Dyspnea on exertion     6. High risk medication use     7. Chronic systolic congestive heart failure, NYHA class 3 (HCC)         Medical Decision Making:  Today's Assessment / Status / Plan:   Non Ischemic Cardiomyopathy:  Chronically illed condition which requires ongoing close monitoring and treatment to improve survival rate along with decreasing risk of clinical decompensation and hospitalization.    Today, based on physical examination findings, patient is euvolemic. No JVD, lungs are clear to auscultation, no pitting edema in bilateral lower extremities, no ascites.      Dry weight is 205 lbs.    Restart Entresto 97/103 mg twice day.    Will continue Carvedilol 25 mg po twice daily.    Continue Spironolactone 25 mg once a day.    Will change Furosemide to as needed.    Based on recent data on SGLT2 and heart failure with reduced ejection fraction, patient will be benefited from Farxiga 10 mg p.o. once a day for further reduction in mortality and hospitalization with absolute risk reduction of 4.9%.  Therefore, I will continue patient on [id number] mg p.o. once a day class effect, due to insurance  issue.  Risks and benefits were explained to patient and patient has agreed to proceed.    Suspect HTN heart disease underlying as cause of dilated cardiomyopathy.    No ICD placement for primary prevention as LVEF is now 40%.    Hypertension:  Blood pressure is much better controlled.  Stop Hydralazine 50 mg bid.  Optimize control using cardiomyopathy medical regimen as well.

## 2021-05-07 NOTE — TELEPHONE ENCOUNTER
YASIR smith called and they need a clarification on the sig for Furosemide 40 mg. Please call [phone].    Thank you,    Nayely WOODS

## 2021-05-07 NOTE — PATIENT INSTRUCTIONS
Restart Entresto 97/103 mg twice day.    Will continue Carvedilol 25 mg po twice daily.    Continue Spironolactone 25 mg once a day.    Will change Furosemide to as needed.

## 2021-09-01 ENCOUNTER — TELEPHONE (OUTPATIENT)
Dept: CARDIOLOGY | Facility: MEDICAL CENTER | Age: 56
End: 2021-09-01

## 2021-09-01 NOTE — TELEPHONE ENCOUNTER
STEVIE SHORE (Key: [id number]) - [id number] help? Call us at [phone]  Status  [facility]  Next [facility]  The plan will fax you a determination, typically within 1 to 5 business days.    How do I follow up?  Drug  Jardiance 10MG tablets  Form  [facility] Medical Necessity Request Form  Medical Necessity Request Form for [employer] [place] Medicaid Members Only.  [phone]  [phone]  Original Claim Info  75 Prior Authorization Required

## 2021-11-22 ENCOUNTER — HOSPITAL ENCOUNTER (OUTPATIENT)
Dept: CARDIOLOGY | Facility: MEDICAL CENTER | Age: 56
End: 2021-11-22
Attending: INTERNAL MEDICINE
Payer: COMMERCIAL

## 2021-11-22 DIAGNOSIS — R06.09 DYSPNEA ON EXERTION: ICD-10-CM

## 2021-11-22 DIAGNOSIS — I51.89 LEFT VENTRICULAR SYSTOLIC DYSFUNCTION, NYHA CLASS 2: ICD-10-CM

## 2021-11-22 DIAGNOSIS — I50.22 CHRONIC SYSTOLIC CONGESTIVE HEART FAILURE, NYHA CLASS 3 (HCC): ICD-10-CM

## 2021-11-22 DIAGNOSIS — I50.20 ACC/AHA STAGE C SYSTOLIC HEART FAILURE (HCC): ICD-10-CM

## 2021-11-22 DIAGNOSIS — I10 HTN (HYPERTENSION), MALIGNANT: ICD-10-CM

## 2021-11-22 DIAGNOSIS — Z79.899 HIGH RISK MEDICATION USE: ICD-10-CM

## 2021-11-22 DIAGNOSIS — I42.8 NON-ISCHEMIC CARDIOMYOPATHY (HCC): ICD-10-CM

## 2021-11-22 PROCEDURE — 93308 TTE F-UP OR LMTD: CPT

## 2021-11-22 PROCEDURE — 93308 TTE F-UP OR LMTD: CPT | Mod: 26 | Performed by: INTERNAL MEDICINE

## 2021-11-29 LAB
LV EJECT FRACT  99904: 40
LV EJECT FRACT MOD 2C 99903: 54.46
LV EJECT FRACT MOD 4C 99902: 54.52
LV EJECT FRACT MOD BP 99901: 55.87

## 2021-11-29 NOTE — RESULT ENCOUNTER NOTE
Dear Jaki,    Can you please let Mack Galloway know that result is ok and I will see patient as scheduled?    Thank you,  Alfonso.

## 2021-12-28 ENCOUNTER — OFFICE VISIT (OUTPATIENT)
Dept: CARDIOLOGY | Facility: MEDICAL CENTER | Age: 56
End: 2021-12-28
Payer: COMMERCIAL

## 2021-12-28 VITALS
DIASTOLIC BLOOD PRESSURE: 90 MMHG | RESPIRATION RATE: 15 BRPM | WEIGHT: 205.6 LBS | OXYGEN SATURATION: 99 % | SYSTOLIC BLOOD PRESSURE: 180 MMHG | HEART RATE: 70 BPM | HEIGHT: 74 IN | BODY MASS INDEX: 26.39 KG/M2

## 2021-12-28 DIAGNOSIS — R06.09 DYSPNEA ON EXERTION: ICD-10-CM

## 2021-12-28 DIAGNOSIS — I10 HTN (HYPERTENSION), MALIGNANT: ICD-10-CM

## 2021-12-28 DIAGNOSIS — I42.8 NON-ISCHEMIC CARDIOMYOPATHY (HCC): ICD-10-CM

## 2021-12-28 DIAGNOSIS — Z79.899 HIGH RISK MEDICATION USE: ICD-10-CM

## 2021-12-28 DIAGNOSIS — I51.89 LEFT VENTRICULAR SYSTOLIC DYSFUNCTION, NYHA CLASS 2: ICD-10-CM

## 2021-12-28 DIAGNOSIS — I50.22 CHRONIC SYSTOLIC CONGESTIVE HEART FAILURE, NYHA CLASS 3 (HCC): ICD-10-CM

## 2021-12-28 DIAGNOSIS — I50.20 ACC/AHA STAGE C SYSTOLIC HEART FAILURE (HCC): ICD-10-CM

## 2021-12-28 PROCEDURE — 99214 OFFICE O/P EST MOD 30 MIN: CPT | Performed by: INTERNAL MEDICINE

## 2021-12-28 RX ORDER — AMLODIPINE BESYLATE 10 MG/1
10 TABLET ORAL DAILY
Qty: 90 TABLET | Refills: 3 | Status: SHIPPED | OUTPATIENT
Start: 2021-12-28 | End: 2021-12-28 | Stop reason: SDUPTHER

## 2021-12-28 RX ORDER — AMLODIPINE BESYLATE 10 MG/1
10 TABLET ORAL DAILY
Qty: 90 TABLET | Refills: 3 | Status: SHIPPED | OUTPATIENT
Start: 2021-12-28 | End: 2022-01-27 | Stop reason: SDUPTHER

## 2021-12-28 ASSESSMENT — ENCOUNTER SYMPTOMS
ABDOMINAL PAIN: 0
SHORTNESS OF BREATH: 1
DEPRESSION: 0
MYALGIAS: 0
COUGH: 0
PALPITATIONS: 0
HEADACHES: 0
MEMORY LOSS: 0
DIZZINESS: 0
BRUISES/BLEEDS EASILY: 0
FALLS: 0
FEVER: 0
DOUBLE VISION: 0
BLURRED VISION: 0
SENSORY CHANGE: 0
DIAPHORESIS: 0

## 2021-12-28 NOTE — PROGRESS NOTES
Chief Complaint   Patient presents with   • Congestive Heart Failure     F/V Dx: ACC/AHA stage C systolic heart failure (HCC)        Subjective:   Mack Galloway is a 56 y.o. male who presents today for cardiac care and management in a heart failure clinic due to recent finding of LV dysfunction on echocardiogram which showed LVEF of 25%.  Patient does have a prior history of dilated cardiomyopathy for which he was cared for by Dr. Sarath Potter in the past.  Patient did have a coronary angiogram which showed nonobstructive CAD in 2015. His EF normalized however, patient stopped going to the doctor and stopped taking his medications.  He is now back with dilated cardiomyopathy.    Patient is feeling better these days. Does get winded upon walking up inclines or for distance. No symptoms at rest or with daily living activities.    I have personally interpreted her EKG today with patient, there is no evidence of acute coronary syndrome, no evidence of prior infarct, normal GA and QT interval, no significant conduction disease.    I have independently interpreted and reviewed blood tests results with patient in clinic which shows normal GFR, K of 4.8.    LVEF of 40% with global hypokinesis. I have independently interpreted and reviewed echocardiogram's actual images. 03/2021.    LVEF of 40% with global hypokinesis. No significant valvular disease. I have independently interpreted and reviewed echocardiogram's actual images. 11/2021.    Insurance did not approve Farxiga. On Jardiance now.    Did get a lot of lightheadedness.    Past Medical History:   Diagnosis Date   • Acute systolic congestive heart failure, NYHA class 4 ([facility]) 10/29/2014   • Diabetes mellitus ([facility]) 10/29/2014    2007    • Hypertension    • Type II or unspecified type diabetes mellitus without mention of complication, not stated as uncontrolled      Past Surgical History:   Procedure Laterality Date   • RECOVERY  2/3/2015    Performed by  [facility] at [facility]   • APPENDECTOMY       Family History   Problem Relation Age of Onset   • Heart Disease Father         CAD   • Diabetes Father    • Heart Disease Paternal Grandfather      Social History     Socioeconomic History   • Marital status: Single     Spouse name: Not on file   • Number of children: Not on file   • Years of education: Not on file   • Highest education level: Not on file   Occupational History   • Not on file   Tobacco Use   • Smoking status: Never Smoker   • Smokeless tobacco: Current User     Types: Chew   Vaping Use   • Vaping Use: Never used   Substance and Sexual Activity   • Alcohol use: No   • Drug use: No   • Sexual activity: Not on file   Other Topics Concern   • Not on file   Social History Narrative   • Not on file     Social Determinants of Health     Financial Resource Strain:    • Difficulty of Paying Living Expenses: Not on file   Food Insecurity:    • Worried About Running Out of Food in the Last Year: Not on file   • Ran Out of Food in the Last Year: Not on file   Transportation Needs:    • Lack of Transportation (Medical): Not on file   • Lack of Transportation (Non-Medical): Not on file   Physical Activity:    • Days of Exercise per Week: Not on file   • Minutes of Exercise per Session: Not on file   Stress:    • Feeling of Stress : Not on file   Social Connections:    • Frequency of Communication with Friends and Family: Not on file   • Frequency of Social Gatherings with Friends and Family: Not on file   • Attends Sikhism Services: Not on file   • Active Member of Clubs or Organizations: Not on file   • Attends Club or Organization Meetings: Not on file   • Marital Status: Not on file   Intimate Partner Violence:    • Fear of Current or Ex-Partner: Not on file   • Emotionally Abused: Not on file   • Physically Abused: Not on file   • Sexually Abused: Not on file   Housing Stability:    • Unable to Pay for Housing in the Last Year:  "Not on file   • Number of Places Lived in the Last Year: Not on file   • Unstable Housing in the Last Year: Not on file     Allergies   Allergen Reactions   • Kiwi Extract Swelling     Outpatient Encounter Medications as of 12/28/2021   Medication Sig Dispense Refill   • furosemide (LASIX) 40 MG Tab Take 1 tablet by mouth see administration instructions. 30 tablet 11   • sacubitril-valsartan (ENTRESTO)  MG Tab tablet Take 1 tablet by mouth 2 times a day. 60 tablet 11   • metFORMIN ER (GLUCOPHAGE XR) 500 MG TABLET SR 24 HR Take 1,500 mg by mouth every day. 1000 mg  mg PM     • Empagliflozin (JARDIANCE) 10 MG Tab Take 10 mg by mouth every day. 30 tablet 11   • spironolactone (ALDACTONE) 25 MG Tab Take 1 tablet by mouth every day. 90 tablet 3   • carvedilol (COREG) 25 MG Tab Take 1 Tab by mouth 2 times a day, with meals. 60 Tab 11   • sildenafil citrate (VIAGRA) 50 MG tablet Take 2 Tabs by mouth as needed for Erectile Dysfunction. 20 Tab 0     No facility-administered encounter medications on file as of 12/28/2021.     Review of Systems   Constitutional: Negative for diaphoresis and fever.   HENT: Negative for nosebleeds.    Eyes: Negative for blurred vision and double vision.   Respiratory: Positive for shortness of breath. Negative for cough.    Cardiovascular: Negative for chest pain and palpitations.   Gastrointestinal: Negative for abdominal pain.   Genitourinary: Negative for dysuria and frequency.   Musculoskeletal: Negative for falls and myalgias.   Skin: Negative for rash.   Neurological: Negative for dizziness, sensory change and headaches.   Endo/Heme/Allergies: Does not bruise/bleed easily.   Psychiatric/Behavioral: Negative for depression and memory loss.        Objective:   BP (!) 180/90 (BP Location: Right arm, Patient Position: Sitting, BP Cuff Size: Adult)   Pulse 70   Resp 15   Ht 1.88 m (6' 2\")   Wt 93.3 kg (205 lb 9.6 oz)   SpO2 99%   BMI 26.40 kg/m²     Physical Exam  Vitals and " nursing note reviewed.   Constitutional:       General: He is not in acute distress.     Appearance: He is not diaphoretic.   HENT:      Head: Normocephalic and atraumatic.      Right Ear: External ear normal.      Left Ear: External ear normal.   Eyes:      General:         Right eye: No discharge.         Left eye: No discharge.   Neck:      Thyroid: No thyromegaly.      Vascular: No JVD.   Cardiovascular:      Rate and Rhythm: Normal rate and regular rhythm.      Comments: Ausculation was not performed to minimize the risk of COVID spread during current pandemic. This complies with Medicare policies and guidelines.    Pulmonary:      Effort: No respiratory distress.   Abdominal:      General: There is no distension.      Tenderness: There is no abdominal tenderness.   Skin:     General: Skin is warm and dry.   Neurological:      Mental Status: He is alert and oriented to person, place, and time.      Cranial Nerves: No cranial nerve deficit.   Psychiatric:         Behavior: Behavior normal.         Assessment:     1. ACC/AHA stage C systolic heart failure (HCC)     2. Left ventricular systolic dysfunction, NYHA class 2     3. HTN (hypertension), malignant     4. Non-ischemic cardiomyopathy (HCC)     5. Dyspnea on exertion     6. High risk medication use     7. Chronic systolic congestive heart failure, NYHA class 3 (HCC)         Medical Decision Making:  Today's Assessment / Status / Plan:   Non Ischemic Cardiomyopathy:  Chronically illed condition which requires ongoing close monitoring and treatment to improve survival rate along with decreasing risk of clinical decompensation and hospitalization.    Today, based on physical examination findings, patient is euvolemic. No JVD, lungs are clear to auscultation, no pitting edema in bilateral lower extremities, no ascites.      Dry weight is 205 lbs.    Restart Entresto 97/103 mg twice day.    Will continue Carvedilol 25 mg po twice daily.    Continue Spironolactone  25 mg once a day.    Will change Furosemide to as needed.    Based on recent data on SGLT2 and heart failure with reduced ejection fraction, patient will be benefited from Jardiance 10 mg p.o. once a day for further reduction in mortality and hospitalization with absolute risk reduction of 5.2%.  Therefore, I will continue patient on Jardiance 10 mg p.o. once a day.  Risks and benefits were explained to patient and patient has agreed to proceed.    Suspect HTN heart disease underlying as cause of dilated cardiomyopathy.    No ICD placement for primary prevention as LVEF is now 40%.    Hypertension:  Blood pressure is NOT controlled.  Will add Amlodipine 10 mg daily for better blood pressure.  Optimize control using cardiomyopathy medical regimen as well.

## 2021-12-29 ENCOUNTER — TELEPHONE (OUTPATIENT)
Dept: VASCULAR LAB | Facility: MEDICAL CENTER | Age: 56
End: 2021-12-29

## 2021-12-29 NOTE — TELEPHONE ENCOUNTER
[facility] for [facility] and Vascular [facility] and Pharmacotherapy Programs    Received CHF referral from Dr. Parks on 12/28/21    Called pt to schedule appt to establish care - no answer. LVM.    Insurance: [employer]  PCP: Non-[facility]  Locations to be seen: [facility] B    [facility] Anticoagulation/Pharmacotherapy Clinic at [phone], fax [phone]    Carl Romero, AsaD, BCACP

## 2022-01-05 ENCOUNTER — TELEPHONE (OUTPATIENT)
Dept: VASCULAR LAB | Facility: MEDICAL CENTER | Age: 57
End: 2022-01-05

## 2022-01-05 NOTE — TELEPHONE ENCOUNTER
[facility] for [facility] and Pharmacotherapy [facility]     Received CHF referral from Dr. Parks on 12/28/21     Called pt to schedule appt to establish care - no answer. LVM.    Sent letter.     Insurance: [employer]  PCP: Non-[facility]  Locations to be seen: [facility] B     [facility] Anticoagulation/Pharmacotherapy Clinic at [phone], fax [phone]     Carl Romero, AsaD, BCACP

## 2022-01-05 NOTE — LETTER
[address]  [place] [address]        Dear Mack Galloway ,    We have been unsuccessful in our attempts to contact you regarding your Cardiology Clinical Pharmacist referral.  Please contact us if you would like to schedule an appointment for help managing your heart failure.    Please contact our clinic so we may assist you.  We are open Monday-Friday 8 am until 5 pm.  You may reach our Service at [phone].        Sincerely,    Asa MarrufoD, BCPS  Clinic Supervisor  [facility]  Outpatient Anticoagulation Service

## 2022-01-12 ENCOUNTER — TELEPHONE (OUTPATIENT)
Dept: VASCULAR LAB | Facility: MEDICAL CENTER | Age: 57
End: 2022-01-12

## 2022-01-12 NOTE — TELEPHONE ENCOUNTER
[facility] for [facility] and V[facility] and Pharmacotherapy Programs     Received CHF referral from Dr. Parks on 12/28/21     Called pt to schedule appt to establish care - no answer. LVM.     Letter sent.    Insurance: [employer]  PCP: Non-[facility]  Locations to be seen: [facility]     [facility] Anticoagulation/Pharmacotherapy Clinic at [phone], fax [phone]     Carl Romero, AsaD, BCACP

## 2022-01-19 ENCOUNTER — TELEPHONE (OUTPATIENT)
Dept: VASCULAR LAB | Facility: MEDICAL CENTER | Age: 57
End: 2022-01-19

## 2022-01-19 NOTE — TELEPHONE ENCOUNTER
[facility] for [facility] and Vascular Health and Pharmacotherapy Programs     Received CHF referral from Dr. Parks on 12/28/21     Called pt to schedule appt to establish care - no answer. LVM.    4th attempt.     Letter sent.     Insurance: [employer]  PCP: Non-[facility]  Locations to be seen: [facility]     [facility] Anticoagulation/Pharmacotherapy Clinic at [phone], fax [phone]     Carl Romero, AsaD, BCACP

## 2022-01-26 ENCOUNTER — TELEPHONE (OUTPATIENT)
Dept: CARDIOLOGY | Facility: MEDICAL CENTER | Age: 57
End: 2022-01-26

## 2022-01-26 RX ORDER — SACUBITRIL AND VALSARTAN 97; 103 MG/1; MG/1
TABLET, FILM COATED ORAL
Qty: 180 TABLET | Refills: 3 | Status: SHIPPED | OUTPATIENT
Start: 2022-01-26 | End: 2022-01-27 | Stop reason: SDUPTHER

## 2022-01-27 ENCOUNTER — OFFICE VISIT (OUTPATIENT)
Dept: CARDIOLOGY | Facility: MEDICAL CENTER | Age: 57
End: 2022-01-27
Payer: COMMERCIAL

## 2022-01-27 VITALS
WEIGHT: 206 LBS | RESPIRATION RATE: 14 BRPM | BODY MASS INDEX: 26.44 KG/M2 | DIASTOLIC BLOOD PRESSURE: 80 MMHG | HEIGHT: 74 IN | HEART RATE: 69 BPM | OXYGEN SATURATION: 98 % | SYSTOLIC BLOOD PRESSURE: 122 MMHG

## 2022-01-27 DIAGNOSIS — I50.20 ACC/AHA STAGE C SYSTOLIC HEART FAILURE (HCC): ICD-10-CM

## 2022-01-27 DIAGNOSIS — I10 HTN (HYPERTENSION), MALIGNANT: ICD-10-CM

## 2022-01-27 DIAGNOSIS — Z79.899 HIGH RISK MEDICATION USE: ICD-10-CM

## 2022-01-27 DIAGNOSIS — I51.89 LEFT VENTRICULAR SYSTOLIC DYSFUNCTION, NYHA CLASS 2: ICD-10-CM

## 2022-01-27 DIAGNOSIS — I42.8 NON-ISCHEMIC CARDIOMYOPATHY (HCC): ICD-10-CM

## 2022-01-27 DIAGNOSIS — R06.09 DYSPNEA ON EXERTION: ICD-10-CM

## 2022-01-27 DIAGNOSIS — I50.22 CHRONIC SYSTOLIC CONGESTIVE HEART FAILURE, NYHA CLASS 3 (HCC): ICD-10-CM

## 2022-01-27 PROCEDURE — 99214 OFFICE O/P EST MOD 30 MIN: CPT | Performed by: INTERNAL MEDICINE

## 2022-01-27 RX ORDER — SACUBITRIL AND VALSARTAN 97; 103 MG/1; MG/1
1 TABLET, FILM COATED ORAL 2 TIMES DAILY
Qty: 180 TABLET | Refills: 3 | Status: SHIPPED | OUTPATIENT
Start: 2022-01-27 | End: 2022-08-02 | Stop reason: SDUPTHER

## 2022-01-27 RX ORDER — SPIRONOLACTONE 25 MG/1
25 TABLET ORAL DAILY
Qty: 90 TABLET | Refills: 3 | Status: SHIPPED | OUTPATIENT
Start: 2022-01-27 | End: 2022-08-02 | Stop reason: SDUPTHER

## 2022-01-27 RX ORDER — AMLODIPINE BESYLATE 10 MG/1
10 TABLET ORAL DAILY
Qty: 90 TABLET | Refills: 3 | Status: SHIPPED | OUTPATIENT
Start: 2022-01-27 | End: 2022-08-02 | Stop reason: SDUPTHER

## 2022-01-27 RX ORDER — EMPAGLIFLOZIN 10 MG/1
10 TABLET, FILM COATED ORAL DAILY
Qty: 90 TABLET | Refills: 3 | Status: SHIPPED | OUTPATIENT
Start: 2022-01-27 | End: 2022-06-27

## 2022-01-27 RX ORDER — CARVEDILOL 25 MG/1
25 TABLET ORAL 2 TIMES DAILY WITH MEALS
Qty: 180 TABLET | Refills: 3 | Status: SHIPPED | OUTPATIENT
Start: 2022-01-27 | End: 2022-08-02 | Stop reason: SDUPTHER

## 2022-01-27 ASSESSMENT — ENCOUNTER SYMPTOMS
DOUBLE VISION: 0
COUGH: 0
SENSORY CHANGE: 0
ABDOMINAL PAIN: 0
MEMORY LOSS: 0
BRUISES/BLEEDS EASILY: 0
DEPRESSION: 0
BLURRED VISION: 0
FEVER: 0
FALLS: 0
DIZZINESS: 0
SHORTNESS OF BREATH: 1
PALPITATIONS: 0
DIAPHORESIS: 0
MYALGIAS: 0
HEADACHES: 0

## 2022-01-27 NOTE — PROGRESS NOTES
Chief Complaint   Patient presents with   • Congestive Heart Failure     F/V DX: ACC/AHA stage C systolic heart failure (HCC)       Subjective:   Mack Galloway is a 56 y.o. male who presents today for cardiac care and management in a heart failure clinic due to recent finding of LV dysfunction on echocardiogram which showed LVEF of 25%.  Patient does have a prior history of dilated cardiomyopathy for which he was cared for by Dr. Sarath Potter in the past.  Patient did have a coronary angiogram which showed nonobstructive CAD in 2015. His EF normalized however, patient stopped going to the doctor and stopped taking his medications.  He is now back with dilated cardiomyopathy.    Patient is feeling better these days. Does get winded upon walking up inclines or for distance. No symptoms at rest or with daily living activities.    I have personally interpreted her EKG today with patient, there is no evidence of acute coronary syndrome, no evidence of prior infarct, normal ME and QT interval, no significant conduction disease.    I have independently interpreted and reviewed blood tests results with patient in clinic which shows normal GFR, K of 4.8.    LVEF of 40% with global hypokinesis. I have independently interpreted and reviewed echocardiogram's actual images. 03/2021.    LVEF of 40% with global hypokinesis. No significant valvular disease. I have independently interpreted and reviewed echocardiogram's actual images. 11/2021.    Insurance did not approve Farxiga. On Jardiance now.    Did get a lot of lightheadedness.    Past Medical History:   Diagnosis Date   • Acute systolic congestive heart failure, NYHA class 4 ([facility]) 10/29/2014   • Diabetes mellitus ([facility]) 10/29/2014    2007    • Hypertension    • Type II or unspecified type diabetes mellitus without mention of complication, not stated as uncontrolled      Past Surgical History:   Procedure Laterality Date   • RECOVERY  2/3/2015    Performed by  [facility] at [facility]   • APPENDECTOMY       Family History   Problem Relation Age of Onset   • Heart Disease Father         CAD   • Diabetes Father    • Heart Disease Paternal Grandfather      Social History     Socioeconomic History   • Marital status: Single     Spouse name: Not on file   • Number of children: Not on file   • Years of education: Not on file   • Highest education level: Not on file   Occupational History   • Not on file   Tobacco Use   • Smoking status: Never Smoker   • Smokeless tobacco: Current User     Types: Chew   Vaping Use   • Vaping Use: Never used   Substance and Sexual Activity   • Alcohol use: No   • Drug use: No   • Sexual activity: Not on file   Other Topics Concern   • Not on file   Social History Narrative   • Not on file     Social Determinants of Health     Financial Resource Strain:    • Difficulty of Paying Living Expenses: Not on file   Food Insecurity:    • Worried About Running Out of Food in the Last Year: Not on file   • Ran Out of Food in the Last Year: Not on file   Transportation Needs:    • Lack of Transportation (Medical): Not on file   • Lack of Transportation (Non-Medical): Not on file   Physical Activity:    • Days of Exercise per Week: Not on file   • Minutes of Exercise per Session: Not on file   Stress:    • Feeling of Stress : Not on file   Social Connections:    • Frequency of Communication with Friends and Family: Not on file   • Frequency of Social Gatherings with Friends and Family: Not on file   • Attends Shinto Services: Not on file   • Active Member of Clubs or Organizations: Not on file   • Attends Club or Organization Meetings: Not on file   • Marital Status: Not on file   Intimate Partner Violence:    • Fear of Current or Ex-Partner: Not on file   • Emotionally Abused: Not on file   • Physically Abused: Not on file   • Sexually Abused: Not on file   Housing Stability:    • Unable to Pay for Housing in the Last Year:  Not on file   • Number of Places Lived in the Last Year: Not on file   • Unstable Housing in the Last Year: Not on file     Allergies   Allergen Reactions   • Kiwi Extract Swelling     Outpatient Encounter Medications as of 1/27/2022   Medication Sig Dispense Refill   • spironolactone (ALDACTONE) 25 MG Tab Take 1 Tablet by mouth every day. 90 Tablet 3   • sacubitril-valsartan (ENTRESTO)  MG Tab tablet Take 1 Tablet by mouth 2 times a day. 180 Tablet 3   • Empagliflozin (JARDIANCE) 10 MG Tab Take 10 mg by mouth every day. 90 Tablet 3   • carvedilol (COREG) 25 MG Tab Take 1 Tablet by mouth 2 times a day with meals. 180 Tablet 3   • amLODIPine (NORVASC) 10 MG Tab Take 1 Tablet by mouth every day. 90 Tablet 3   • furosemide (LASIX) 40 MG Tab Take 1 tablet by mouth see administration instructions. 30 tablet 11   • metFORMIN ER (GLUCOPHAGE XR) 500 MG TABLET SR 24 HR Take 1,500 mg by mouth every day. 1000 mg  mg PM     • sildenafil citrate (VIAGRA) 50 MG tablet Take 2 Tabs by mouth as needed for Erectile Dysfunction. 20 Tab 0   • [DISCONTINUED] ENTRESTO  MG Tab tablet TAKE 1 TABLET BY MOUTH TWICE DAILY 180 Tablet 3   • [DISCONTINUED] amLODIPine (NORVASC) 10 MG Tab Take 1 Tablet by mouth every day. 90 Tablet 3   • [DISCONTINUED] Empagliflozin (JARDIANCE) 10 MG Tab Take 10 mg by mouth every day. 30 tablet 11   • [DISCONTINUED] spironolactone (ALDACTONE) 25 MG Tab Take 1 tablet by mouth every day. 90 tablet 3   • [DISCONTINUED] carvedilol (COREG) 25 MG Tab Take 1 Tab by mouth 2 times a day, with meals. 60 Tab 11     No facility-administered encounter medications on file as of 1/27/2022.     Review of Systems   Constitutional: Negative for diaphoresis and fever.   HENT: Negative for nosebleeds.    Eyes: Negative for blurred vision and double vision.   Respiratory: Positive for shortness of breath. Negative for cough.    Cardiovascular: Negative for chest pain and palpitations.   Gastrointestinal: Negative for  "abdominal pain.   Genitourinary: Negative for dysuria and frequency.   Musculoskeletal: Negative for falls and myalgias.   Skin: Negative for rash.   Neurological: Negative for dizziness, sensory change and headaches.   Endo/Heme/Allergies: Does not bruise/bleed easily.   Psychiatric/Behavioral: Negative for depression and memory loss.        Objective:   /80 (BP Location: Right arm, Patient Position: Sitting, BP Cuff Size: Adult)   Pulse 69   Resp 14   Ht 1.88 m (6' 2\")   Wt 93.4 kg (206 lb)   SpO2 98%   BMI 26.45 kg/m²     Physical Exam  Vitals and nursing note reviewed.   Constitutional:       General: He is not in acute distress.     Appearance: He is not diaphoretic.   HENT:      Head: Normocephalic and atraumatic.      Right Ear: External ear normal.      Left Ear: External ear normal.   Eyes:      General:         Right eye: No discharge.         Left eye: No discharge.   Neck:      Thyroid: No thyromegaly.      Vascular: No JVD.   Cardiovascular:      Rate and Rhythm: Normal rate and regular rhythm.      Comments: Ausculation was not performed to minimize the risk of COVID spread during current pandemic. This complies with Medicare policies and guidelines.    Pulmonary:      Effort: No respiratory distress.   Abdominal:      General: There is no distension.      Tenderness: There is no abdominal tenderness.   Skin:     General: Skin is warm and dry.   Neurological:      Mental Status: He is alert and oriented to person, place, and time.      Cranial Nerves: No cranial nerve deficit.   Psychiatric:         Behavior: Behavior normal.         Assessment:     1. ACC/AHA stage C systolic heart failure (HCC)  spironolactone (ALDACTONE) 25 MG Tab    sacubitril-valsartan (ENTRESTO)  MG Tab tablet    Empagliflozin (JARDIANCE) 10 MG Tab    carvedilol (COREG) 25 MG Tab    EC-ECHOCARDIOGRAM LTD W/O CONT   2. Left ventricular systolic dysfunction, NYHA class 2  spironolactone (ALDACTONE) 25 MG Tab    " sacubitril-valsartan (ENTRESTO)  MG Tab tablet    Empagliflozin (JARDIANCE) 10 MG Tab    carvedilol (COREG) 25 MG Tab    EC-ECHOCARDIOGRAM LTD W/O CONT   3. HTN (hypertension), malignant  amLODIPine (NORVASC) 10 MG Tab   4. Non-ischemic cardiomyopathy (HCC)     5. Dyspnea on exertion  EC-ECHOCARDIOGRAM LTD W/O CONT   6. High risk medication use     7. Chronic systolic congestive heart failure, NYHA class 3 (HCC)  spironolactone (ALDACTONE) 25 MG Tab    sacubitril-valsartan (ENTRESTO)  MG Tab tablet    Empagliflozin (JARDIANCE) 10 MG Tab    carvedilol (COREG) 25 MG Tab    EC-ECHOCARDIOGRAM LTD W/O CONT       Medical Decision Making:  Today's Assessment / Status / Plan:   Non Ischemic Cardiomyopathy:  Chronically illed condition which requires ongoing close monitoring and treatment to improve survival rate along with decreasing risk of clinical decompensation and hospitalization.    Today, based on physical examination findings, patient is euvolemic. No JVD, lungs are clear to auscultation, no pitting edema in bilateral lower extremities, no ascites.      Dry weight is 205 lbs.    Continue Entresto 97/103 mg twice day.    Will continue Carvedilol 25 mg po twice daily.    Continue Spironolactone 25 mg once a day.    Will change Furosemide to as needed.    Based on recent data on SGLT2 and heart failure with reduced ejection fraction, patient will be benefited from Jardiance 10 mg p.o. once a day for further reduction in mortality and hospitalization with absolute risk reduction of 5.2%.  Therefore, I will continue patient on Jardiance 10 mg p.o. once a day.  Risks and benefits were explained to patient and patient has agreed to proceed.    Suspect HTN heart disease underlying as cause of dilated cardiomyopathy.    No ICD placement for primary prevention as LVEF is now 40%.    Hypertension:  Blood pressure is NOT controlled.  Will add Amlodipine 10 mg daily for better blood pressure.  Optimize control using  cardiomyopathy medical regimen as well.

## 2022-02-14 ENCOUNTER — NON-PROVIDER VISIT (OUTPATIENT)
Dept: CARDIOLOGY | Facility: MEDICAL CENTER | Age: 57
End: 2022-02-14
Payer: COMMERCIAL

## 2022-02-14 VITALS
HEART RATE: 78 BPM | WEIGHT: 231 LBS | DIASTOLIC BLOOD PRESSURE: 95 MMHG | BODY MASS INDEX: 29.66 KG/M2 | SYSTOLIC BLOOD PRESSURE: 185 MMHG

## 2022-02-14 PROCEDURE — 99211 OFF/OP EST MAY X REQ PHY/QHP: CPT | Performed by: STUDENT IN AN ORGANIZED HEALTH CARE EDUCATION/TRAINING PROGRAM

## 2022-02-14 NOTE — NON-PROVIDER
CHF Pharmacotherapy visit - Visit    Date of Service: 02/14/22    Informed written consent was given on: 2/14/22    Mack Galloway is here for CHF/HTN    HPI  Pertinent Interval History since last visit:   • Pt's initial visit    Most recent EF:  40% (11/2021)      Current Outpatient Medications:   •  spironolactone, 25 mg, Oral, DAILY  •  Entresto, 1 Tablet, Oral, BID  •  Jardiance, 10 mg, Oral, DAILY  •  carvedilol, 25 mg, Oral, BID WITH MEALS (Patient taking differently: 37.5 mg, Oral, 2 TIMES DAILY WITH MEALS)  •  amLODIPine, 10 mg, Oral, DAILY  •  furosemide, 40 mg, Oral, See Admin Instructions  •  metFORMIN ER, 1,500 mg, Oral, DAILY  •  sildenafil citrate, 100 mg, Oral, PRN    Current Adherence to CHF Therapies:  Complete    Current CHF Medications - including dose:   • Entresto or ACE/ARB: Entresto  mg BID  • Beta blocker: Carvedilol 25 mg BID  • Diuretic: Furosemide 40 mg once daily PRN  • Aldosterone antagonist: Spironolactone 25 mg once daily      Vitals:    02/14/22 0943   BP: (!) 185/95   Pulse: 78       Home BP and HR:  120's/70-80's, HR ~ 80     Change in weight: Pt is up ~ 15 lbs since last visit - went to [place] and admits he was less strict w/ his diet.    Exercise habits: moderate regular exercise program - Pt walks 1 mile daily. He also does commercial fishing a couple of days per week.    Diet: low sodium    SOCIAL HISTORY  Social History     Tobacco Use   Smoking Status Never Smoker   Smokeless Tobacco Current User   • Types: Chew        DATA REVIEW  Lab Results   Component Value Date/Time    HBA1C 8.7 (H) 10/29/2014 10:35 AM          Lab Results   Component Value Date/Time    CHOLSTRLTOT 122 10/30/2014 04:28 AM    LDL 66 10/30/2014 04:28 AM    HDL 42 10/30/2014 04:28 AM    TRIGLYCERIDE 72 10/30/2014 04:28 AM       Lab Results   Component Value Date/Time    SODIUM 137 02/03/2015 07:26 AM    POTASSIUM 4.1 02/03/2015 07:26 AM    CHLORIDE 106 02/03/2015 07:26 AM    CO2 23 02/03/2015  07:26 AM    GLUCOSE 103 (H) 02/03/2015 07:26 AM    BUN 20 02/03/2015 07:26 AM    CREATININE 0.78 02/03/2015 07:26 AM     Lab Results   Component Value Date/Time    ALKPHOSPHAT 84 10/30/2014 04:28 AM    ASTSGOT 21 10/30/2014 04:28 AM    ALTSGPT 17 10/30/2014 04:28 AM    TBILIRUBIN 1.7 (H) 10/30/2014 04:28 AM    INR 1.08 02/03/2015 07:26 AM    ALBUMIN 3.3 10/30/2014 04:28 AM      No components found for: MICROALBUMINCREATRATIOURINE    Renal function:  Calculated creatinine clearance: > 100 ml/min     Other:  Immunization History   Administered Date(s) Administered   • Influenza Vac Subunit Quad Inj (Pf) 11/07/2017   • Influenza Vaccine Quad Inj (Pf) 01/05/2021   • Pneumococcal polysaccharide vaccine (PPSV-23) 10/29/2014     Up to date on pneumococcal vaccine? Yes      Recent Imaging Studies:    None since last visit      ASSESSMENT AND PLAN  1. CHF  • Provided basic education on CHF, preventing exacerbation, documenting daily weights and BP, importance of medication adherence  • Pt presents to clinic c/o Jardiance cost as it is $385/month currently. Pt currently enrolled w/ copay card via  - his copay is still this high even w/ the [employer] discount card. Called  today in clinic - they state that his high copay may be d/t pt having high deductible w/ the new year. Pt instructed to call ins to discuss further.  • Between pt and his wife, they make > $60k/year - pt will not qualify for PAP given this.  • Pt recently returned from [place] where he admits he did not monitor his Na intake. He plans to get back on track w/ his Na monitoring.  • Pt was significantly hypertensive today in clinic. This does not correspond to his reported home BP readings. Pt states he is somewhat stressed w/ this appt and traveling into town today from [place]. Pt denies s/sx of HTN - states he feels great. Recommended pt go to the ED for further workup at this time, pt declines at this time.     Resulting CHF  medications today (changes are bolded)  • Entresto or ACE/ARB: Entresto  mg BID  • Beta blocker: Increase carvedilol up to 37.5 mg BID  • Diuretic: Furosemide 40 mg once daily PRN  • Aldosterone antagonist: Spironolactone 25 mg once daily    Lifestyle Recommendations From Today's Visit:   • Continue to eat DASH/MED style diet.   • Continue to exercise as tolerated.     Significant changes to laboratory values since last visit that require repeat labs:  • N/a    Blood Work Ordered At Today's visit:   None - Pt seeing PCP this week. Will f/u w/ [facility] for updated labs next week.    Studies Ordered at Todays Visit:  None     Follow-Up:   1 months    Carl Romero, PharmD, BCACP      CC:  Nathanael Daniel D.O.  No ref. provider found    xMedications reconciled  xFlow sheets updated

## 2022-03-21 ENCOUNTER — NON-PROVIDER VISIT (OUTPATIENT)
Dept: CARDIOLOGY | Facility: MEDICAL CENTER | Age: 57
End: 2022-03-21
Payer: COMMERCIAL

## 2022-03-21 VITALS
BODY MASS INDEX: 28.04 KG/M2 | DIASTOLIC BLOOD PRESSURE: 77 MMHG | WEIGHT: 218.4 LBS | HEART RATE: 66 BPM | SYSTOLIC BLOOD PRESSURE: 143 MMHG

## 2022-03-21 PROCEDURE — 99211 OFF/OP EST MAY X REQ PHY/QHP: CPT | Performed by: INTERNAL MEDICINE

## 2022-03-21 NOTE — NON-PROVIDER
CHF Pharmacotherapy visit - Visit    Date of Service: 03/21/22    Informed written consent was given on: 2/14/22    Mack Galloway is here for CHF/HTN    HPI  Pertinent Interval History since last visit:   • Since pt's last appt he did not increase his carvedilol up d/t blood sugar fluctuations  o Pt working w/ PCP for DM management  • Pt has yet to s/w his insurance regarding Jardiance copay    Most recent EF:  40% (11/2021)      Current Outpatient Medications:   •  spironolactone, 25 mg, Oral, DAILY  •  Entresto, 1 Tablet, Oral, BID  •  Jardiance, 10 mg, Oral, DAILY  •  carvedilol, 25 mg, Oral, BID WITH MEALS (Patient taking differently: 37.5 mg, Oral, 2 TIMES DAILY WITH MEALS)  •  amLODIPine, 10 mg, Oral, DAILY  •  furosemide, 40 mg, Oral, See Admin Instructions  •  metFORMIN ER, 1,500 mg, Oral, DAILY  •  sildenafil citrate, 100 mg, Oral, PRN    Current Adherence to CHF Therapies:  Complete    Current CHF Medications - including dose:   • Entresto or ACE/ARB: Entresto  mg BID  • Beta blocker: Carvedilol 25 mg BID  • Diuretic: Furosemide 40 mg once daily PRN  • Aldosterone antagonist: Spironolactone 25 mg once daily  • SGLT2i: Jardiance 10 mg once daily    Vitals:    03/21/22 0813   BP: 143/77   Pulse: 66       Home BP and HR:  BP - 127/73 this AM, 146/86, highest 160/92  HR - 61 to 80    Change in weight: Pt down ~ 13 lbs since his last visit w/ me    Exercise habits: moderate regular exercise program - Pt walks 1 mile daily. He also does commercial fishing a couple of days per week.     Diet: low sodium    SOCIAL HISTORY  Social History     Tobacco Use   Smoking Status Never Smoker   Smokeless Tobacco Current User   • Types: Chew        DATA REVIEW  Lab Results   Component Value Date/Time    HBA1C 8.7 (H) 10/29/2014 10:35 AM          Lab Results   Component Value Date/Time    CHOLSTRLTOT 122 10/30/2014 04:28 AM    LDL 66 10/30/2014 04:28 AM    HDL 42 10/30/2014 04:28 AM    TRIGLYCERIDE 72 10/30/2014  04:28 AM       Lab Results   Component Value Date/Time    SODIUM 137 02/03/2015 07:26 AM    POTASSIUM 4.1 02/03/2015 07:26 AM    CHLORIDE 106 02/03/2015 07:26 AM    CO2 23 02/03/2015 07:26 AM    GLUCOSE 103 (H) 02/03/2015 07:26 AM    BUN 20 02/03/2015 07:26 AM    CREATININE 0.78 02/03/2015 07:26 AM     Lab Results   Component Value Date/Time    ALKPHOSPHAT 84 10/30/2014 04:28 AM    ASTSGOT 21 10/30/2014 04:28 AM    ALTSGPT 17 10/30/2014 04:28 AM    TBILIRUBIN 1.7 (H) 10/30/2014 04:28 AM    INR 1.08 02/03/2015 07:26 AM    ALBUMIN 3.3 10/30/2014 04:28 AM      No components found for: MICROALBUMINCREATRATIOURINE    Renal function:  Calculated creatinine clearance: ~ 69 ml/min     Other:  Immunization History   Administered Date(s) Administered   • Influenza Vac Subunit Quad Inj (Pf) 11/07/2017   • Influenza Vaccine Quad Inj (Pf) 01/05/2021   • Pneumococcal polysaccharide vaccine (PPSV-23) 10/29/2014     Up to date on pneumococcal vaccine? No, pt to get PCV15 at community pharmacy prior to f/u      Recent Imaging Studies:    None since last visit      ASSESSMENT AND PLAN  1. CHF  • Pt presents to clinic today doing well overall. He has lost a significant amount of weight now that he is back from his [place] vacation and back to his routine/baseline.  • Since pt's last appt, he did not increase his carvedilol up as instructed d/t concern for blood glucose fluctuations. Counseled pt that BP medications should have no effect on BG and that BG should have very little to no effect on BP.  • Pt's BP & HR were at goal x 1 reading this AM in clinic as well as at home prior to his appt today.   • Of note, pt has yet to p/u Jardiance - unclear if his copay is lower at this time. Advised pt to call pharmacotherapy clinic should this become unaffordable moving forward as we can assist w/ sample support, but it is unlikely pt will qualify for PAP given his household income.    Resulting CHF medications today (changes are  bolded)  • Entresto or ACE/ARB: Entresto  mg BID  • Beta blocker: Carvedilol 25 mg BID  • Diuretic: Furosemide 40 mg once daily PRN  • Aldosterone antagonist: Spironolactone 25 mg once daily  • SGLT2i: Jardiance 10 mg once daily    Lifestyle Recommendations From Today's Visit:   • Continue to eat DASH/MED style diet.   • Continue to exercise as tolerated.     Significant changes to laboratory values since last visit that require repeat labs:  • N/a    Blood Work Ordered At Today's visit:   None    Studies Ordered at Todays Visit:  None     Follow-Up:   3 months    Carl Romero, PharmD, BCACP      CC:  Debo Tolbert M.D.  No ref. provider found    xMedications reconciled  xFlow sheets updated

## 2022-06-27 ENCOUNTER — NON-PROVIDER VISIT (OUTPATIENT)
Dept: CARDIOLOGY | Facility: MEDICAL CENTER | Age: 57
End: 2022-06-27
Payer: COMMERCIAL

## 2022-06-27 VITALS
BODY MASS INDEX: 27.73 KG/M2 | DIASTOLIC BLOOD PRESSURE: 82 MMHG | HEART RATE: 65 BPM | SYSTOLIC BLOOD PRESSURE: 155 MMHG | WEIGHT: 216 LBS

## 2022-06-27 DIAGNOSIS — I42.0 DILATED CARDIOMYOPATHY (HCC): ICD-10-CM

## 2022-06-27 PROCEDURE — 99211 OFF/OP EST MAY X REQ PHY/QHP: CPT | Performed by: STUDENT IN AN ORGANIZED HEALTH CARE EDUCATION/TRAINING PROGRAM

## 2022-06-27 RX ORDER — EMPAGLIFLOZIN 25 MG/1
25 TABLET, FILM COATED ORAL DAILY
Qty: 30 TABLET | Refills: 0 | Status: SHIPPED | OUTPATIENT
Start: 2022-06-27 | End: 2022-06-27 | Stop reason: SDUPTHER

## 2022-06-27 RX ORDER — EMPAGLIFLOZIN 25 MG/1
25 TABLET, FILM COATED ORAL DAILY
Qty: 30 TABLET | Refills: 0 | Status: SHIPPED | OUTPATIENT
Start: 2022-06-27 | End: 2022-08-02 | Stop reason: SDUPTHER

## 2022-06-27 NOTE — PROGRESS NOTES
CHF Pharmacotherapy visit - Visit    Date of Service: 06/27/22    Informed written consent was given on: 2/14/22    Mack Galloway is here for CHF and HTN    HPI  Pertinent Interval History since last visit:   • No significant hx to note    Most recent EF:  40% (11/2021)      Current Outpatient Medications:   •  spironolactone, 25 mg, Oral, DAILY  •  Entresto, 1 Tablet, Oral, BID  •  Jardiance, 10 mg, Oral, DAILY  •  carvedilol, 25 mg, Oral, BID WITH MEALS (Patient taking differently: 37.5 mg, Oral, 2 TIMES DAILY WITH MEALS)  •  amLODIPine, 10 mg, Oral, DAILY  •  furosemide, 40 mg, Oral, See Admin Instructions  •  metFORMIN ER, 1,500 mg, Oral, DAILY  •  sildenafil citrate, 100 mg, Oral, PRN    Current Adherence to CHF Therapies:  Complete    Current CHF Medications - including dose:   • Entresto or ACE/ARB: Entresto  mg BID  • Beta blocker: Carvedilol 25 mg BID  • Diuretic: Furosemide 40 mg once daily PRN - Pt has not been using x the last month d/t not requiring  • Aldosterone antagonist: Spironolactone 25 mg once daily  • SGLT2i: Jardiance 25 mg once daily    There were no vitals filed for this visit.    Home BP and HR:  Typically ~ 113/76  Typically ~ 65    Change in weight: Pt down ~ 2 lbs since last appt    Exercise habits: Pt works outside, gardens, will walk occasionally     Diet: low sodium    SOCIAL HISTORY  Social History     Tobacco Use   Smoking Status Never Smoker   Smokeless Tobacco Current User   • Types: Chew        DATA REVIEW  Lab Results   Component Value Date/Time    HBA1C 8.7 (H) 10/29/2014 10:35 AM          Lab Results   Component Value Date/Time    CHOLSTRLTOT 122 10/30/2014 04:28 AM    LDL 66 10/30/2014 04:28 AM    HDL 42 10/30/2014 04:28 AM    TRIGLYCERIDE 72 10/30/2014 04:28 AM       Lab Results   Component Value Date/Time    SODIUM 137 02/03/2015 07:26 AM    POTASSIUM 4.1 02/03/2015 07:26 AM    CHLORIDE 106 02/03/2015 07:26 AM    CO2 23 02/03/2015 07:26 AM    GLUCOSE 103 (H)  02/03/2015 07:26 AM    BUN 20 02/03/2015 07:26 AM    CREATININE 0.78 02/03/2015 07:26 AM     Lab Results   Component Value Date/Time    ALKPHOSPHAT 84 10/30/2014 04:28 AM    ASTSGOT 21 10/30/2014 04:28 AM    ALTSGPT 17 10/30/2014 04:28 AM    TBILIRUBIN 1.7 (H) 10/30/2014 04:28 AM    INR 1.08 02/03/2015 07:26 AM    ALBUMIN 3.3 10/30/2014 04:28 AM      No components found for: MICROALBUMINCREATRATIOURINE    Renal function:  Calculated creatinine clearance: ~ 69 ml/min     Other:  Immunization History   Administered Date(s) Administered   • Influenza Vac Subunit Quad Inj (Pf) 11/07/2017   • Influenza Vaccine Quad Inj (Pf) 01/05/2021   • Pneumococcal polysaccharide vaccine (PPSV-23) 10/29/2014     Up to date on pneumococcal vaccine? Yes      Recent Imaging Studies:    None since last visit      ASSESSMENT AND PLAN  1. CHF  • Pt presents to clinic doing well overall. Since his last appt, his PCP increased his Jardiance up to 25 mg once daily. He is tolerating this well, but affordability remains an issue for pt. His Entresto is also expensive. Pt is enrolled for savings w/ coupon cards, but states that between the two meds that they cost ~$1300 every 2 months. He will discuss w/ his wife about switching ins for better Rx coverage.  • Pt reported BP/HR is well controlled, but BP remains elevated in clinic. Pt states he took his meds this AM. He states coming to this appt and discussing medication cost is stressful to him.   • Pt instructed to bring his BP monitor to our next f/u appt to compare to clinic monitor.  • Defer medication titration today given reported home readings well w/in goal range and pt does endorse occasional dizziness potentially attributed to hypotension.    Resulting CHF medications today (changes are bolded)  • Entresto or ACE/ARB: Entresto  mg BID  • Beta blocker: Carvedilol 25 mg BID  • Diuretic: Furosemide 40 mg once daily PRN   • Aldosterone antagonist: Spironolactone 25 mg once  daily  • SGLT2i: Jardiance 25 mg once daily    Lifestyle Recommendations From Today's Visit:   • Continue to eat DASH/MED style diet.   • Continue to exercise as tolerated.     Significant changes to laboratory values since last visit that require repeat labs:  • No new labs to evaluate    Blood Work Ordered At Today's visit:   CMP    Studies Ordered at Todays Visit:  None     Follow-Up:   ~ 2 months    Carl Romero PharmD, BCACP      CC:  Debo Tolbert M.D.  No ref. provider found    xMedications reconciled  xFlow sheets updated

## 2022-07-27 ENCOUNTER — HOSPITAL ENCOUNTER (OUTPATIENT)
Dept: CARDIOLOGY | Facility: MEDICAL CENTER | Age: 57
End: 2022-07-27
Attending: INTERNAL MEDICINE
Payer: COMMERCIAL

## 2022-07-27 DIAGNOSIS — I51.89 LEFT VENTRICULAR SYSTOLIC DYSFUNCTION, NYHA CLASS 2: ICD-10-CM

## 2022-07-27 DIAGNOSIS — R06.09 DYSPNEA ON EXERTION: ICD-10-CM

## 2022-07-27 DIAGNOSIS — I50.22 CHRONIC SYSTOLIC CONGESTIVE HEART FAILURE, NYHA CLASS 3 (HCC): ICD-10-CM

## 2022-07-27 DIAGNOSIS — I50.20 ACC/AHA STAGE C SYSTOLIC HEART FAILURE (HCC): ICD-10-CM

## 2022-07-27 LAB
LV EJECT FRACT  99904: 45
LV EJECT FRACT MOD 2C 99903: 50
LV EJECT FRACT MOD 4C 99902: 52
LV EJECT FRACT MOD BP 99901: 50

## 2022-07-27 PROCEDURE — 93308 TTE F-UP OR LMTD: CPT | Mod: 26 | Performed by: INTERNAL MEDICINE

## 2022-07-27 PROCEDURE — 93308 TTE F-UP OR LMTD: CPT

## 2022-08-02 ENCOUNTER — OFFICE VISIT (OUTPATIENT)
Dept: CARDIOLOGY | Facility: MEDICAL CENTER | Age: 57
End: 2022-08-02
Payer: COMMERCIAL

## 2022-08-02 VITALS
BODY MASS INDEX: 27.85 KG/M2 | OXYGEN SATURATION: 98 % | SYSTOLIC BLOOD PRESSURE: 128 MMHG | HEIGHT: 74 IN | HEART RATE: 67 BPM | WEIGHT: 217 LBS | DIASTOLIC BLOOD PRESSURE: 82 MMHG | RESPIRATION RATE: 16 BRPM

## 2022-08-02 DIAGNOSIS — Z79.899 HIGH RISK MEDICATION USE: ICD-10-CM

## 2022-08-02 DIAGNOSIS — I51.89 LEFT VENTRICULAR SYSTOLIC DYSFUNCTION, NYHA CLASS 2: ICD-10-CM

## 2022-08-02 DIAGNOSIS — I10 HTN (HYPERTENSION), MALIGNANT: ICD-10-CM

## 2022-08-02 DIAGNOSIS — I42.0 DILATED CARDIOMYOPATHY (HCC): ICD-10-CM

## 2022-08-02 DIAGNOSIS — R06.09 DYSPNEA ON EXERTION: ICD-10-CM

## 2022-08-02 DIAGNOSIS — I50.22 CHRONIC SYSTOLIC CONGESTIVE HEART FAILURE, NYHA CLASS 3 (HCC): ICD-10-CM

## 2022-08-02 DIAGNOSIS — I42.8 NON-ISCHEMIC CARDIOMYOPATHY (HCC): ICD-10-CM

## 2022-08-02 DIAGNOSIS — I50.20 ACC/AHA STAGE C SYSTOLIC HEART FAILURE (HCC): ICD-10-CM

## 2022-08-02 PROCEDURE — 99214 OFFICE O/P EST MOD 30 MIN: CPT | Performed by: INTERNAL MEDICINE

## 2022-08-02 RX ORDER — SPIRONOLACTONE 25 MG/1
25 TABLET ORAL DAILY
Qty: 90 TABLET | Refills: 3 | Status: SHIPPED | OUTPATIENT
Start: 2022-08-02 | End: 2023-08-29 | Stop reason: SDUPTHER

## 2022-08-02 RX ORDER — SACUBITRIL AND VALSARTAN 97; 103 MG/1; MG/1
1 TABLET, FILM COATED ORAL 2 TIMES DAILY
Qty: 180 TABLET | Refills: 3 | Status: SHIPPED | OUTPATIENT
Start: 2022-08-02 | End: 2023-08-29

## 2022-08-02 RX ORDER — AMLODIPINE BESYLATE 10 MG/1
10 TABLET ORAL DAILY
Qty: 90 TABLET | Refills: 3 | Status: SHIPPED | OUTPATIENT
Start: 2022-08-02 | End: 2023-08-03

## 2022-08-02 RX ORDER — CARVEDILOL 25 MG/1
37.5 TABLET ORAL 2 TIMES DAILY WITH MEALS
Qty: 270 TABLET | Refills: 3 | Status: SHIPPED | OUTPATIENT
Start: 2022-08-02 | End: 2023-08-29 | Stop reason: SDUPTHER

## 2022-08-02 RX ORDER — EMPAGLIFLOZIN 25 MG/1
25 TABLET, FILM COATED ORAL DAILY
Qty: 90 TABLET | Refills: 3 | Status: SHIPPED | OUTPATIENT
Start: 2022-08-02 | End: 2023-08-29 | Stop reason: SDUPTHER

## 2022-08-02 ASSESSMENT — MINNESOTA LIVING WITH HEART FAILURE QUESTIONNAIRE (MLHF)
DIFFICULTY TO CONCENTRATE OR REMEMBERING THINGS: 0
MAKING YOU FEEL DEPRESSED: 0
DIFFICULTY WITH SEXUAL ACTIVITIES: 0
COSTING YOU MONEY FOR MEDICAL CARE: 0
DIFFICULTY SLEEPING WELL AT NIGHT: 0
DIFFICULTY SOCIALIZING WITH FAMILY OR FRIENDS: 0
DIFFICULTY GOING AWAY FROM HOME: 0
TIRED, FATIGUED OR LOW ON ENERGY: 1
FEELING LIKE A BURDEN TO FAMILY AND FRIENDS: 0
GIVING YOU SIDE EFFECTS FROM TREATMENTS: 0
WORKING AROUND THE HOUSE OR YARD DIFFICULT: 0
HAVING TO SIT OR LIE DOWN DURING THE DAY: 0
DIFFICULTY WORKING TO EARN A LIVING: 0
LOSS OF SELF CONTROL IN YOUR LIFE: 0
DIFFICULTY WITH RECREATIONAL PASTIMES, SPORTS, HOBBIES: 0
TOTAL_SCORE: 1
WALKING ABOUT OR CLIMBING STAIRS DIFFICULT: 0
MAKING YOU SHORT OF BREATH: 0
EATING LESS FOODS YOU LIKE: 0
MAKING YOU STAY IN A HOSPITAL: 0
MAKING YOU WORRY: 0
SWELLING IN ANKLES OR LEGS: 0

## 2022-08-02 ASSESSMENT — ENCOUNTER SYMPTOMS
DIZZINESS: 0
BLURRED VISION: 0
DIAPHORESIS: 0
DOUBLE VISION: 0
SENSORY CHANGE: 0
PALPITATIONS: 0
SHORTNESS OF BREATH: 1
MEMORY LOSS: 0
ABDOMINAL PAIN: 0
BRUISES/BLEEDS EASILY: 0
MYALGIAS: 0
COUGH: 0
FEVER: 0
DEPRESSION: 0
FALLS: 0
HEADACHES: 0

## 2022-08-02 NOTE — PROGRESS NOTES
Chief Complaint   Patient presents with   • Congestive Heart Failure     F/v dx: ACC/AHA stage C systolic heart failure (HCC)   • Hypertension       Subjective:   Mack Galloway is a 57 y.o. male who presents today for cardiac care and management in a heart failure clinic due to recent finding of LV dysfunction on echocardiogram which showed LVEF of 25%.  Patient does have a prior history of dilated cardiomyopathy for which he was cared for by Dr. Sarath Potter in the past.  Patient did have a coronary angiogram which showed nonobstructive CAD in 2015. His EF normalized however, patient stopped going to the doctor and stopped taking his medications.  He is now back with dilated cardiomyopathy.    I have personally interpreted her EKG today with patient, there is no evidence of acute coronary syndrome, no evidence of prior infarct, normal NJ and QT interval, no significant conduction disease.    LVEF of 40% with global hypokinesis. I have independently interpreted and reviewed echocardiogram's actual images. 03/2021.    LVEF of 40% with global hypokinesis. No significant valvular disease. I have independently interpreted and reviewed echocardiogram's actual images. 11/2021.    LVEF of 45% with global hypokinesis. No significant valvular disease. I have independently interpreted and reviewed echocardiogram's actual images. 07/2022.    Insurance did not approve Farxiga. On Jardiance now.    Did get a lot of lightheadedness.    I have independently interpreted and reviewed blood tests results with patient in clinic which shows GFR of 51, K of 4.6 in 02/2022. New recent labs yet.    Patient is feeling better these days. Does get winded upon walking up inclines or for distance. No symptoms at rest or with daily living activities.      Past Medical History:   Diagnosis Date   • Acute systolic congestive heart failure, NYHA class 4 ([facility]) 10/29/2014   • Diabetes mellitus ([facility]) 10/29/2014    2007    • Hypertension    •  Type II or unspecified type diabetes mellitus without mention of complication, not stated as uncontrolled      Past Surgical History:   Procedure Laterality Date   • RECOVERY  2/3/2015    Performed by Cath-Recovery Surgery at [facility]   • APPENDECTOMY       Family History   Problem Relation Age of Onset   • Heart Disease Father         CAD   • Diabetes Father    • Heart Disease Paternal Grandfather      Social History     Socioeconomic History   • Marital status: Single     Spouse name: Not on file   • Number of children: Not on file   • Years of education: Not on file   • Highest education level: Not on file   Occupational History   • Not on file   Tobacco Use   • Smoking status: Never Smoker   • Smokeless tobacco: Current User     Types: Chew   Vaping Use   • Vaping Use: Never used   Substance and Sexual Activity   • Alcohol use: No   • Drug use: No   • Sexual activity: Not on file   Other Topics Concern   • Not on file   Social History Narrative   • Not on file     Social Determinants of Health     Financial Resource Strain: Not on file   Food Insecurity: Not on file   Transportation Needs: Not on file   Physical Activity: Not on file   Stress: Not on file   Social Connections: Not on file   Intimate Partner Violence: Not on file   Housing Stability: Not on file     Allergies   Allergen Reactions   • Kiwi Extract Swelling     Outpatient Encounter Medications as of 8/2/2022   Medication Sig Dispense Refill   • Empagliflozin (JARDIANCE) 25 MG Tab Take 1 tablet by mouth every day. 30 Tablet 0   • spironolactone (ALDACTONE) 25 MG Tab Take 1 Tablet by mouth every day. 90 Tablet 3   • sacubitril-valsartan (ENTRESTO)  MG Tab tablet Take 1 Tablet by mouth 2 times a day. 180 Tablet 3   • carvedilol (COREG) 25 MG Tab Take 1 Tablet by mouth 2 times a day with meals. (Patient taking differently: Take 37.5 mg by mouth 2 times a day with meals.) 180 Tablet 3   • amLODIPine (NORVASC) 10 MG Tab Take 1  "Tablet by mouth every day. 90 Tablet 3   • furosemide (LASIX) 40 MG Tab Take 1 tablet by mouth see administration instructions. 30 tablet 11   • sildenafil citrate (VIAGRA) 50 MG tablet Take 2 Tabs by mouth as needed for Erectile Dysfunction. 20 Tab 0   • metFORMIN ER (GLUCOPHAGE XR) 500 MG TABLET SR 24 HR Take 1,500 mg by mouth every day. 1000 mg  mg PM (Patient not taking: Reported on 8/2/2022)       No facility-administered encounter medications on file as of 8/2/2022.     Review of Systems   Constitutional: Negative for diaphoresis and fever.   HENT: Negative for nosebleeds.    Eyes: Negative for blurred vision and double vision.   Respiratory: Positive for shortness of breath. Negative for cough.    Cardiovascular: Negative for chest pain and palpitations.   Gastrointestinal: Negative for abdominal pain.   Genitourinary: Negative for dysuria and frequency.   Musculoskeletal: Negative for falls and myalgias.   Skin: Negative for rash.   Neurological: Negative for dizziness, sensory change and headaches.   Endo/Heme/Allergies: Does not bruise/bleed easily.   Psychiatric/Behavioral: Negative for depression and memory loss.        Objective:   /82 (BP Location: Left arm, Patient Position: Sitting, BP Cuff Size: Adult)   Pulse 67   Resp 16   Ht 1.88 m (6' 2\")   Wt 98.4 kg (217 lb)   SpO2 98%   BMI 27.86 kg/m²     Physical Exam  Vitals and nursing note reviewed.   Constitutional:       General: He is not in acute distress.     Appearance: He is not diaphoretic.   HENT:      Head: Normocephalic and atraumatic.      Right Ear: External ear normal.      Left Ear: External ear normal.   Eyes:      General:         Right eye: No discharge.         Left eye: No discharge.   Neck:      Thyroid: No thyromegaly.      Vascular: No JVD.   Cardiovascular:      Rate and Rhythm: Normal rate and regular rhythm.      Comments: Ausculation was not performed to minimize the risk of COVID spread during current " pandemic. This complies with Medicare policies and guidelines.    Pulmonary:      Effort: No respiratory distress.   Abdominal:      General: There is no distension.      Tenderness: There is no abdominal tenderness.   Skin:     General: Skin is warm and dry.   Neurological:      Mental Status: He is alert and oriented to person, place, and time.      Cranial Nerves: No cranial nerve deficit.   Psychiatric:         Behavior: Behavior normal.         Assessment:     1. ACC/AHA stage C systolic heart failure (HCC)     2. Left ventricular systolic dysfunction, NYHA class 2     3. Chronic systolic congestive heart failure, NYHA class 3 (HCC)     4. Dilated cardiomyopathy (HCC)     5. HTN (hypertension), malignant     6. Non-ischemic cardiomyopathy (HCC)     7. Dyspnea on exertion     8. High risk medication use         Medical Decision Making:  Today's Assessment / Status / Plan:   Non Ischemic Cardiomyopathy:  Chronically illed condition which requires ongoing close monitoring and treatment to improve survival rate along with decreasing risk of clinical decompensation and hospitalization.    Today, based on physical examination findings, patient is euvolemic. No JVD, lungs are clear to auscultation, no pitting edema in bilateral lower extremities, no ascites.      Dry weight is 205 lbs.    Continue Entresto 97/103 mg twice day.    Will continue Carvedilol 37.5 mg po twice daily.    Continue Spironolactone 25 mg once a day.    Furosemide to as needed.    Based on recent data on SGLT2 and heart failure with reduced ejection fraction, patient will be benefited from Jardiance 10 mg p.o. once a day for further reduction in mortality and hospitalization with absolute risk reduction of 5.2%.  Therefore, I will continue patient on Jardiance 25 mg p.o. once a day.  Risks and benefits were explained to patient and patient has agreed to proceed.    Suspect HTN heart disease underlying as cause of dilated cardiomyopathy.    No ICD  placement for primary prevention as LVEF is now 45%.    Hypertension:  Blood pressure is controlled.  Continue Amlodipine 10 mg daily for better blood pressure.  Optimize control using cardiomyopathy medical regimen as well.

## 2023-08-02 DIAGNOSIS — I10 HTN (HYPERTENSION), MALIGNANT: ICD-10-CM

## 2023-08-02 NOTE — TELEPHONE ENCOUNTER
Is the patient due for a refill? Yes- courtesy refill    Was the patient seen the past year? Yes    Date of last office visit: 8/2/2022    Does the patient have an upcoming appointment?  No   If yes, When?     Provider to refill:TT    Does the patients insurance require a 100 day supply?  No

## 2023-08-03 RX ORDER — AMLODIPINE BESYLATE 10 MG/1
10 TABLET ORAL DAILY
Qty: 90 TABLET | Refills: 0 | Status: SHIPPED | OUTPATIENT
Start: 2023-08-03 | End: 2023-08-29 | Stop reason: SDUPTHER

## 2023-08-29 ENCOUNTER — OFFICE VISIT (OUTPATIENT)
Dept: CARDIOLOGY | Facility: MEDICAL CENTER | Age: 58
End: 2023-08-29
Attending: INTERNAL MEDICINE
Payer: COMMERCIAL

## 2023-08-29 VITALS
RESPIRATION RATE: 18 BRPM | BODY MASS INDEX: 27.98 KG/M2 | SYSTOLIC BLOOD PRESSURE: 140 MMHG | OXYGEN SATURATION: 94 % | HEIGHT: 74 IN | DIASTOLIC BLOOD PRESSURE: 84 MMHG | WEIGHT: 218 LBS | HEART RATE: 64 BPM

## 2023-08-29 DIAGNOSIS — I42.8 NON-ISCHEMIC CARDIOMYOPATHY (HCC): ICD-10-CM

## 2023-08-29 DIAGNOSIS — I42.0 DILATED CARDIOMYOPATHY (HCC): ICD-10-CM

## 2023-08-29 DIAGNOSIS — I50.20 ACC/AHA STAGE C SYSTOLIC HEART FAILURE (HCC): ICD-10-CM

## 2023-08-29 DIAGNOSIS — I50.22 CHRONIC SYSTOLIC CONGESTIVE HEART FAILURE, NYHA CLASS 3 (HCC): ICD-10-CM

## 2023-08-29 DIAGNOSIS — I51.89 LEFT VENTRICULAR SYSTOLIC DYSFUNCTION, NYHA CLASS 2: ICD-10-CM

## 2023-08-29 DIAGNOSIS — I10 HTN (HYPERTENSION), MALIGNANT: ICD-10-CM

## 2023-08-29 DIAGNOSIS — R06.09 DYSPNEA ON EXERTION: ICD-10-CM

## 2023-08-29 DIAGNOSIS — Z79.899 HIGH RISK MEDICATION USE: ICD-10-CM

## 2023-08-29 PROCEDURE — 99212 OFFICE O/P EST SF 10 MIN: CPT | Performed by: INTERNAL MEDICINE

## 2023-08-29 PROCEDURE — 3079F DIAST BP 80-89 MM HG: CPT | Performed by: INTERNAL MEDICINE

## 2023-08-29 PROCEDURE — 3077F SYST BP >= 140 MM HG: CPT | Performed by: INTERNAL MEDICINE

## 2023-08-29 PROCEDURE — 99214 OFFICE O/P EST MOD 30 MIN: CPT | Performed by: INTERNAL MEDICINE

## 2023-08-29 RX ORDER — SPIRONOLACTONE 25 MG/1
25 TABLET ORAL DAILY
Qty: 90 TABLET | Refills: 3 | Status: SHIPPED | OUTPATIENT
Start: 2023-08-29

## 2023-08-29 RX ORDER — CARVEDILOL 25 MG/1
37.5 TABLET ORAL 2 TIMES DAILY WITH MEALS
Qty: 270 TABLET | Refills: 3 | Status: SHIPPED | OUTPATIENT
Start: 2023-08-29

## 2023-08-29 RX ORDER — AMLODIPINE BESYLATE 10 MG/1
10 TABLET ORAL DAILY
Qty: 90 TABLET | Refills: 4 | Status: SHIPPED | OUTPATIENT
Start: 2023-08-29

## 2023-08-29 RX ORDER — LOSARTAN POTASSIUM 100 MG/1
100 TABLET ORAL DAILY
Qty: 100 TABLET | Refills: 4 | Status: SHIPPED | OUTPATIENT
Start: 2023-08-29

## 2023-08-29 RX ORDER — RIBOFLAVIN (VITAMIN B2) 100 MG
1000 TABLET ORAL
COMMUNITY

## 2023-08-29 RX ORDER — EMPAGLIFLOZIN 25 MG/1
25 TABLET, FILM COATED ORAL DAILY
Qty: 90 TABLET | Refills: 3 | Status: SHIPPED | OUTPATIENT
Start: 2023-08-29

## 2023-08-29 RX ORDER — PYRIDOXINE HCL (VITAMIN B6) 50 MG
500 TABLET ORAL
COMMUNITY

## 2023-08-29 ASSESSMENT — ENCOUNTER SYMPTOMS
SENSORY CHANGE: 0
SHORTNESS OF BREATH: 1
DIAPHORESIS: 0
FEVER: 0
MYALGIAS: 0
DIZZINESS: 0
BRUISES/BLEEDS EASILY: 0
DOUBLE VISION: 0
COUGH: 0
FALLS: 0
PALPITATIONS: 0
HEADACHES: 0
BLURRED VISION: 0
MEMORY LOSS: 0
ABDOMINAL PAIN: 0
DEPRESSION: 0

## 2023-08-29 NOTE — PROGRESS NOTES
Chief Complaint   Patient presents with    Congestive Heart Failure     F/V Dx: ACC/AHA stage C systolic heart failure (HCC)       Subjective:   Mack Galloway is a 58 y.o. male who presents today for cardiac care and management in a heart failure clinic due to recent finding of LV dysfunction on echocardiogram which showed LVEF of 25%.  Patient does have a prior history of dilated cardiomyopathy for which he was cared for by Dr. Sarath Potter in the past.  Patient did have a coronary angiogram which showed nonobstructive CAD in 2015. His EF normalized however, patient stopped going to the doctor and stopped taking his medications.  He is now back with dilated cardiomyopathy.    I have personally interpreted her EKG today with patient, there is no evidence of acute coronary syndrome, no evidence of prior infarct, normal OR and QT interval, no significant conduction disease.    LVEF of 40% with global hypokinesis. I have independently interpreted and reviewed echocardiogram's actual images. 03/2021.    LVEF of 40% with global hypokinesis. No significant valvular disease. I have independently interpreted and reviewed echocardiogram's actual images. 11/2021.    LVEF of 45% with global hypokinesis. No significant valvular disease. I have independently interpreted and reviewed echocardiogram's actual images. 07/2022.    Insurance did not approve Farxiga. On Jardiance now.    No new labs.    Patient is feeling better these days. Does get winded upon walking up inclines or for distance. No symptoms at rest or with daily living activities.    Cannot afford Entresto. Off of it for 4 months.    Past Medical History:   Diagnosis Date    Acute systolic congestive heart failure, NYHA class 4 ([facility]) 10/29/2014    Diabetes mellitus ([facility]) 10/29/2014    2007     Hypertension     Type II or unspecified type diabetes mellitus without mention of complication, not stated as uncontrolled      Past Surgical History:   Procedure  Laterality Date    RECOVERY  2/3/2015    Performed by Cath-Recovery Surgery at [facility]    APPENDECTOMY       Family History   Problem Relation Age of Onset    Heart Disease Father         CAD    Diabetes Father     Heart Disease Paternal Grandfather      Social History     Socioeconomic History    Marital status: Single     Spouse name: Not on file    Number of children: Not on file    Years of education: Not on file    Highest education level: Not on file   Occupational History    Not on file   Tobacco Use    Smoking status: Never    Smokeless tobacco: Current     Types: Chew   Vaping Use    Vaping Use: Never used   Substance and Sexual Activity    Alcohol use: Yes     Comment: occ    Drug use: No    Sexual activity: Not on file   Other Topics Concern    Not on file   Social History Narrative    Not on file     Social Determinants of Health     Financial Resource Strain: Not on file   Food Insecurity: Not on file   Transportation Needs: Not on file   Physical Activity: Not on file   Stress: Not on file   Social Connections: Not on file   Intimate Partner Violence: Not on file   Housing Stability: Not on file     Allergies   Allergen Reactions    Kiwi Extract Swelling     Outpatient Encounter Medications as of 8/29/2023   Medication Sig Dispense Refill    Ascorbic Acid (VITAMIN C) 100 MG Tab Take 1,000 mg by mouth. 1 tab everyday      Cyanocobalamin (B-12) 100 MCG Tab Take 500 mcg by mouth. 2 tabs every day      losartan (COZAAR) 100 MG Tab Take 1 Tablet by mouth every day. 100 Tablet 4    amLODIPine (NORVASC) 10 MG Tab TAKE 1 TABLET BY MOUTH EVERY DAY 90 Tablet 0    spironolactone (ALDACTONE) 25 MG Tab Take 1 Tablet by mouth every day. 90 Tablet 3    carvedilol (COREG) 25 MG Tab Take 1.5 Tablets by mouth 2 times a day with meals. 270 Tablet 3    Empagliflozin (JARDIANCE) 25 MG Tab Take 1 tablet by mouth every day. 90 Tablet 3    furosemide (LASIX) 40 MG Tab Take 1 tablet by mouth see  "administration instructions. 30 tablet 11    sildenafil citrate (VIAGRA) 50 MG tablet Take 2 Tabs by mouth as needed for Erectile Dysfunction. 20 Tab 0    [DISCONTINUED] sacubitril-valsartan (ENTRESTO)  MG Tab tablet Take 1 Tablet by mouth 2 times a day. (Patient not taking: Reported on 8/29/2023) 180 Tablet 3    [DISCONTINUED] metFORMIN ER (GLUCOPHAGE XR) 500 MG TABLET SR 24 HR Take 1,500 mg by mouth every day. 1000 mg  mg PM (Patient not taking: Reported on 8/2/2022)       No facility-administered encounter medications on file as of 8/29/2023.     Review of Systems   Constitutional:  Negative for diaphoresis and fever.   HENT:  Negative for nosebleeds.    Eyes:  Negative for blurred vision and double vision.   Respiratory:  Positive for shortness of breath. Negative for cough.    Cardiovascular:  Negative for chest pain and palpitations.   Gastrointestinal:  Negative for abdominal pain.   Genitourinary:  Negative for dysuria and frequency.   Musculoskeletal:  Negative for falls and myalgias.   Skin:  Negative for rash.   Neurological:  Negative for dizziness, sensory change and headaches.   Endo/Heme/Allergies:  Does not bruise/bleed easily.   Psychiatric/Behavioral:  Negative for depression and memory loss.         Objective:   BP (!) 140/84 (BP Location: Left arm, Patient Position: Sitting, BP Cuff Size: Adult)   Pulse 64   Resp 18   Ht 1.88 m (6' 2\")   Wt 98.9 kg (218 lb)   SpO2 94%   BMI 27.99 kg/m²     Physical Exam  Vitals and nursing note reviewed.   Constitutional:       General: He is not in acute distress.     Appearance: He is not diaphoretic.   HENT:      Head: Normocephalic and atraumatic.      Right Ear: External ear normal.      Left Ear: External ear normal.      Nose: No congestion or rhinorrhea.   Eyes:      General:         Right eye: No discharge.         Left eye: No discharge.   Neck:      Thyroid: No thyromegaly.      Vascular: No JVD.   Cardiovascular:      Rate and " Rhythm: Normal rate and regular rhythm.      Pulses: Normal pulses.   Pulmonary:      Effort: No respiratory distress.   Abdominal:      General: There is no distension.      Tenderness: There is no abdominal tenderness.   Musculoskeletal:         General: No swelling or tenderness.      Right lower leg: No edema.      Left lower leg: No edema.   Skin:     General: Skin is warm and dry.   Neurological:      Mental Status: He is alert and oriented to person, place, and time.      Cranial Nerves: No cranial nerve deficit.   Psychiatric:         Behavior: Behavior normal.         Assessment:     1. ACC/AHA stage C systolic heart failure (HCC)  losartan (COZAAR) 100 MG Tab      2. Left ventricular systolic dysfunction, NYHA class 2  losartan (COZAAR) 100 MG Tab      3. HTN (hypertension), malignant  losartan (COZAAR) 100 MG Tab      4. Non-ischemic cardiomyopathy (HCC)        5. Dyspnea on exertion        6. High risk medication use        7. Chronic systolic congestive heart failure, NYHA class 3 (HCC)            Medical Decision Making:  Today's Assessment / Status / Plan:   Non Ischemic Cardiomyopathy:  Chronically illed condition which requires ongoing close monitoring and treatment to improve survival rate along with decreasing risk of clinical decompensation and hospitalization.    Today, based on physical examination findings, patient is euvolemic. No JVD, lungs are clear to auscultation, no pitting edema in bilateral lower extremities, no ascites.      Dry weight is 218 lbs.    Will start Losartan 100 mg daily.    Will continue Carvedilol 37.5 mg po twice daily, Spironolactone 25 mg once a day.    Furosemide to as needed.    Based on recent data on SGLT2 and heart failure with reduced ejection fraction, patient will be benefited from Jardiance 10 mg p.o. once a day for further reduction in mortality and hospitalization with absolute risk reduction of 5.2%.  Therefore, I will continue patient on Jardiance 25 mg  p.o. once a day.  Risks and benefits were explained to patient and patient has agreed to proceed.    Suspect HTN heart disease underlying as cause of dilated cardiomyopathy.    No ICD placement for primary prevention as LVEF is now 45%.    Hypertension:  Blood pressure is borderline controlled.  Continue Amlodipine 10 mg daily for better blood pressure.  Optimize control using cardiomyopathy medical regimen as well.    Lazaro Parks M.D.

## 2023-11-28 ENCOUNTER — HOSPITAL ENCOUNTER (OUTPATIENT)
Dept: CARDIOLOGY | Facility: MEDICAL CENTER | Age: 58
End: 2023-11-28
Attending: INTERNAL MEDICINE
Payer: COMMERCIAL

## 2023-11-28 DIAGNOSIS — R06.09 DYSPNEA ON EXERTION: ICD-10-CM

## 2023-11-28 DIAGNOSIS — I51.89 LEFT VENTRICULAR SYSTOLIC DYSFUNCTION, NYHA CLASS 2: ICD-10-CM

## 2023-11-28 DIAGNOSIS — I50.20 ACC/AHA STAGE C SYSTOLIC HEART FAILURE (HCC): ICD-10-CM

## 2023-11-28 LAB
LV EJECT FRACT  99904: 45
LV EJECT FRACT MOD 2C 99903: 41.34
LV EJECT FRACT MOD 4C 99902: 49.34
LV EJECT FRACT MOD BP 99901: 44.76

## 2023-11-28 PROCEDURE — 93306 TTE W/DOPPLER COMPLETE: CPT

## 2023-11-28 PROCEDURE — 93306 TTE W/DOPPLER COMPLETE: CPT | Mod: 26 | Performed by: INTERNAL MEDICINE

## 2024-06-25 DIAGNOSIS — I51.89 LEFT VENTRICULAR SYSTOLIC DYSFUNCTION, NYHA CLASS 2: ICD-10-CM

## 2024-06-25 DIAGNOSIS — I50.20 ACC/AHA STAGE C SYSTOLIC HEART FAILURE (HCC): ICD-10-CM

## 2024-06-25 DIAGNOSIS — I50.22 CHRONIC SYSTOLIC CONGESTIVE HEART FAILURE, NYHA CLASS 3 (HCC): ICD-10-CM

## 2024-06-25 RX ORDER — SPIRONOLACTONE 25 MG/1
25 TABLET ORAL DAILY
Qty: 90 TABLET | Refills: 0 | Status: SHIPPED | OUTPATIENT
Start: 2024-06-25

## 2024-06-25 RX ORDER — CARVEDILOL 25 MG/1
TABLET ORAL
Qty: 180 TABLET | Refills: 0 | Status: SHIPPED | OUTPATIENT
Start: 2024-06-25

## 2024-06-25 NOTE — TELEPHONE ENCOUNTER
90- day courtesy refill sent to pharmacy. Pt needs some labs and to schedule a F/V.    To : Please contact pt to schedule an appointment. Thank you.

## 2024-08-28 ENCOUNTER — OFFICE VISIT (OUTPATIENT)
Dept: CARDIOLOGY | Facility: MEDICAL CENTER | Age: 59
End: 2024-08-28
Attending: INTERNAL MEDICINE
Payer: COMMERCIAL

## 2024-08-28 VITALS
WEIGHT: 228 LBS | HEART RATE: 73 BPM | RESPIRATION RATE: 12 BRPM | DIASTOLIC BLOOD PRESSURE: 74 MMHG | OXYGEN SATURATION: 98 % | BODY MASS INDEX: 29.26 KG/M2 | SYSTOLIC BLOOD PRESSURE: 138 MMHG | HEIGHT: 74 IN

## 2024-08-28 DIAGNOSIS — I42.8 NON-ISCHEMIC CARDIOMYOPATHY (HCC): ICD-10-CM

## 2024-08-28 DIAGNOSIS — I42.0 DILATED CARDIOMYOPATHY (HCC): ICD-10-CM

## 2024-08-28 DIAGNOSIS — I50.20 ACC/AHA STAGE C SYSTOLIC HEART FAILURE (HCC): ICD-10-CM

## 2024-08-28 DIAGNOSIS — I51.89 LEFT VENTRICULAR SYSTOLIC DYSFUNCTION, NYHA CLASS 2: ICD-10-CM

## 2024-08-28 DIAGNOSIS — I10 HTN (HYPERTENSION), MALIGNANT: ICD-10-CM

## 2024-08-28 DIAGNOSIS — R06.09 DYSPNEA ON EXERTION: ICD-10-CM

## 2024-08-28 PROCEDURE — 99213 OFFICE O/P EST LOW 20 MIN: CPT | Performed by: INTERNAL MEDICINE

## 2024-08-28 PROCEDURE — 99212 OFFICE O/P EST SF 10 MIN: CPT | Performed by: INTERNAL MEDICINE

## 2024-08-28 RX ORDER — AMLODIPINE BESYLATE 10 MG/1
10 TABLET ORAL DAILY
Qty: 90 TABLET | Refills: 4 | Status: SHIPPED | OUTPATIENT
Start: 2024-08-28

## 2024-08-28 RX ORDER — SPIRONOLACTONE 25 MG/1
25 TABLET ORAL DAILY
Qty: 90 TABLET | Refills: 0 | Status: SHIPPED | OUTPATIENT
Start: 2024-08-28

## 2024-08-28 RX ORDER — EMPAGLIFLOZIN 25 MG/1
25 TABLET, FILM COATED ORAL DAILY
Qty: 90 TABLET | Refills: 3 | Status: SHIPPED | OUTPATIENT
Start: 2024-08-28

## 2024-08-28 RX ORDER — LOSARTAN POTASSIUM 100 MG/1
100 TABLET ORAL DAILY
Qty: 100 TABLET | Refills: 4 | Status: SHIPPED | OUTPATIENT
Start: 2024-08-28

## 2024-08-28 RX ORDER — CARVEDILOL 25 MG/1
25 TABLET ORAL 2 TIMES DAILY WITH MEALS
Qty: 180 TABLET | Refills: 4 | Status: SHIPPED | OUTPATIENT
Start: 2024-08-28

## 2024-08-28 ASSESSMENT — ENCOUNTER SYMPTOMS
ABDOMINAL PAIN: 0
DIZZINESS: 0
PALPITATIONS: 0
FEVER: 0
BRUISES/BLEEDS EASILY: 0
MEMORY LOSS: 0
DIAPHORESIS: 0
DEPRESSION: 0
MYALGIAS: 0
COUGH: 0
DOUBLE VISION: 0
SENSORY CHANGE: 0
SHORTNESS OF BREATH: 1
BLURRED VISION: 0
FALLS: 0
HEADACHES: 0

## 2024-08-28 ASSESSMENT — MINNESOTA LIVING WITH HEART FAILURE QUESTIONNAIRE (MLHF)
DIFFICULTY TO CONCENTRATE OR REMEMBERING THINGS: 0
DIFFICULTY GOING AWAY FROM HOME: 0
GIVING YOU SIDE EFFECTS FROM TREATMENTS: 0
LOSS OF SELF CONTROL IN YOUR LIFE: 0
SWELLING IN ANKLES OR LEGS: 1
TOTAL_SCORE: 8
COSTING YOU MONEY FOR MEDICAL CARE: 1
DIFFICULTY WITH RECREATIONAL PASTIMES, SPORTS, HOBBIES: 1
DIFFICULTY WITH SEXUAL ACTIVITIES: 0
TIRED, FATIGUED OR LOW ON ENERGY: 1
WORKING AROUND THE HOUSE OR YARD DIFFICULT: 1
EATING LESS FOODS YOU LIKE: 1
MAKING YOU FEEL DEPRESSED: 0
MAKING YOU SHORT OF BREATH: 1
DIFFICULTY WORKING TO EARN A LIVING: 0
MAKING YOU STAY IN A HOSPITAL: 0
HAVING TO SIT OR LIE DOWN DURING THE DAY: 0
DIFFICULTY SLEEPING WELL AT NIGHT: 0
MAKING YOU WORRY: 0
FEELING LIKE A BURDEN TO FAMILY AND FRIENDS: 0
WALKING ABOUT OR CLIMBING STAIRS DIFFICULT: 1
DIFFICULTY SOCIALIZING WITH FAMILY OR FRIENDS: 0

## 2024-08-28 NOTE — PROGRESS NOTES
Chief Complaint   Patient presents with    Congestive Heart Failure     F/V DX: Chronic systolic congestive heart failure, NYHA class 3 ([facility])    Cardiomegaly     F/V DX: Dilated cardiomyopathy ([facility])    Hypertension     F/V DX: Hypertension       Subjective:   Mack Galloway is a 59 y.o. male who presents today for cardiac care and management in a heart failure clinic due to recent finding of LV dysfunction on echocardiogram which showed LVEF of 25%.  Patient does have a prior history of dilated cardiomyopathy for which he was cared for by Dr. Sarath Potter in the past.  Patient did have a coronary angiogram which showed nonobstructive CAD in 2015. His EF normalized however, patient stopped going to the doctor and stopped taking his medications.  He is now back with dilated cardiomyopathy.    I have personally interpreted her EKG today with patient, there is no evidence of acute coronary syndrome, no evidence of prior infarct, normal IN and QT interval, no significant conduction disease.    LVEF of 40% with global hypokinesis. I have independently interpreted and reviewed echocardiogram's actual images. 03/2021.    LVEF of 40% with global hypokinesis. No significant valvular disease. I have independently interpreted and reviewed echocardiogram's actual images. 11/2021.    11/2023 LVEF of 45% with global hypokinesis. No significant valvular disease. I have independently interpreted and reviewed echocardiogram's actual images. 07/2022.    Insurance did not approve Farxiga. On Jardiance now.    No new labs.    Patient is feeling better these days. Does get winded upon walking up inclines or for distance. No symptoms at rest or with daily living activities.    Cannot afford Entresto.    Past Medical History:   Diagnosis Date    Acute systolic congestive heart failure, NYHA class 4 ([facility]) 10/29/2014    Diabetes mellitus ([facility]) 10/29/2014    2007     Hypertension     Type II or unspecified type diabetes mellitus without  mention of complication, not stated as uncontrolled      Past Surgical History:   Procedure Laterality Date    RECOVERY  2/3/2015    Performed by Cath[facility]Recovery Surgery at [facility]    APPENDECTOMY       Family History   Problem Relation Age of Onset    Heart Disease Father         CAD    Diabetes Father     Heart Disease Paternal Grandfather      Social History     Socioeconomic History    Marital status:      Spouse name: Not on file    Number of children: Not on file    Years of education: Not on file    Highest education level: Not on file   Occupational History    Not on file   Tobacco Use    Smoking status: Never    Smokeless tobacco: Current     Types: Chew   Vaping Use    Vaping status: Never Used   Substance and Sexual Activity    Alcohol use: Yes     Comment: occ    Drug use: No    Sexual activity: Not on file   Other Topics Concern    Not on file   Social History Narrative    Not on file     Social Determinants of Health     Financial Resource Strain: Not on file   Food Insecurity: Not on file   Transportation Needs: Not on file   Physical Activity: Not on file   Stress: Not on file   Social Connections: Not on file   Intimate Partner Violence: Not on file   Housing Stability: Not on file     Allergies   Allergen Reactions    Kiwi Extract Swelling     Outpatient Encounter Medications as of 8/28/2024   Medication Sig Dispense Refill    amLODIPine (NORVASC) 10 MG Tab Take 1 Tablet by mouth every day. 90 Tablet 4    carvedilol (COREG) 25 MG Tab Take 1 Tablet by mouth 2 times a day with meals. 180 Tablet 4    Empagliflozin (JARDIANCE) 25 MG Tab Take 1 tablet by mouth every day. 90 Tablet 3    losartan (COZAAR) 100 MG Tab Take 1 Tablet by mouth every day. 100 Tablet 4    spironolactone (ALDACTONE) 25 MG Tab Take 1 Tablet by mouth every day. 90 Tablet 0    Ascorbic Acid (VITAMIN C) 100 MG Tab Take 1,000 mg by mouth. 1 tab everyday      Cyanocobalamin (B-12) 100 MCG Tab Take 500 mcg by  "mouth. 2 tabs every day      furosemide (LASIX) 40 MG Tab Take 1 tablet by mouth see administration instructions. 30 tablet 11    sildenafil citrate (VIAGRA) 50 MG tablet Take 2 Tabs by mouth as needed for Erectile Dysfunction. 20 Tab 0    [DISCONTINUED] carvedilol (COREG) 25 MG Tab TAKE 1 AND 1/2 TABLETS BY MOUTH TWICE DAILY WITH MEALS 180 Tablet 0    [DISCONTINUED] spironolactone (ALDACTONE) 25 MG Tab TAKE 1 TABLET BY MOUTH EVERY DAY 90 Tablet 0    [DISCONTINUED] losartan (COZAAR) 100 MG Tab Take 1 Tablet by mouth every day. 100 Tablet 4    [DISCONTINUED] Empagliflozin (JARDIANCE) 25 MG Tab Take 1 tablet by mouth every day. (Patient not taking: Reported on 8/28/2024) 90 Tablet 3    [DISCONTINUED] amLODIPine (NORVASC) 10 MG Tab Take 1 Tablet by mouth every day. 90 Tablet 4     No facility-administered encounter medications on file as of 8/28/2024.     Review of Systems   Constitutional:  Negative for diaphoresis and fever.   HENT:  Negative for nosebleeds.    Eyes:  Negative for blurred vision and double vision.   Respiratory:  Positive for shortness of breath. Negative for cough.    Cardiovascular:  Negative for chest pain and palpitations.   Gastrointestinal:  Negative for abdominal pain.   Genitourinary:  Negative for dysuria and frequency.   Musculoskeletal:  Negative for falls and myalgias.   Skin:  Negative for rash.   Neurological:  Negative for dizziness, sensory change and headaches.   Endo/Heme/Allergies:  Does not bruise/bleed easily.   Psychiatric/Behavioral:  Negative for depression and memory loss.         Objective:   /74 (BP Location: Left arm, Patient Position: Sitting, BP Cuff Size: Adult)   Pulse 73   Resp 12   Ht 1.88 m (6' 2\")   Wt 103 kg (228 lb)   SpO2 98%   BMI 29.27 kg/m²     Physical Exam  Vitals and nursing note reviewed.   Constitutional:       General: He is not in acute distress.     Appearance: He is not diaphoretic.   HENT:      Head: Normocephalic and atraumatic.      " Right Ear: External ear normal.      Left Ear: External ear normal.      Nose: No congestion or rhinorrhea.   Eyes:      General:         Right eye: No discharge.         Left eye: No discharge.   Neck:      Thyroid: No thyromegaly.      Vascular: No JVD.   Cardiovascular:      Rate and Rhythm: Normal rate and regular rhythm.      Pulses: Normal pulses.   Pulmonary:      Effort: No respiratory distress.   Abdominal:      General: There is no distension.      Tenderness: There is no abdominal tenderness.   Musculoskeletal:         General: No swelling or tenderness.      Right lower leg: No edema.      Left lower leg: No edema.   Skin:     General: Skin is warm and dry.   Neurological:      Mental Status: He is alert and oriented to person, place, and time.      Cranial Nerves: No cranial nerve deficit.   Psychiatric:         Behavior: Behavior normal.         Assessment:     1. ACC/AHA stage C systolic heart failure (HCC)  carvedilol (COREG) 25 MG Tab    losartan (COZAAR) 100 MG Tab    spironolactone (ALDACTONE) 25 MG Tab    EC-ECHOCARDIOGRAM COMPLETE W/O CONT      2. Left ventricular systolic dysfunction, NYHA class 2  carvedilol (COREG) 25 MG Tab    losartan (COZAAR) 100 MG Tab    spironolactone (ALDACTONE) 25 MG Tab    EC-ECHOCARDIOGRAM COMPLETE W/O CONT      3. Dyspnea on exertion  EC-ECHOCARDIOGRAM COMPLETE W/O CONT    proBrain Natriuretic Peptide, NT      4. HTN (hypertension), malignant  amLODIPine (NORVASC) 10 MG Tab    losartan (COZAAR) 100 MG Tab    Basic Metabolic Panel      5. Non-ischemic cardiomyopathy (HCC)        6. Dilated cardiomyopathy (HCC)  Empagliflozin (JARDIANCE) 25 MG Tab            Medical Decision Making:  Today's Assessment / Status / Plan:   Non Ischemic Cardiomyopathy:  Chronically illed condition which requires ongoing close monitoring and treatment to improve survival rate along with decreasing risk of clinical decompensation and hospitalization.    Today, based on physical  examination findings, patient is euvolemic. No JVD, lungs are clear to auscultation, no pitting edema in bilateral lower extremities, no ascites.      Dry weight is 228 lbs.    Will continue Losartan 100 mg daily.    Will continue Carvedilol 37.5 mg po twice daily, Spironolactone 25 mg once a day.    Furosemide to as needed.    Based on recent data on SGLT2 and heart failure with reduced ejection fraction, patient will be benefited from Jardiance 10 mg p.o. once a day for further reduction in mortality and hospitalization with absolute risk reduction of 5.2%.  Therefore, I will continue and restart patient on Jardiance 25 mg p.o. once a day.  Risks and benefits were explained to patient and patient has agreed to proceed.    Suspect HTN heart disease underlying as cause of dilated cardiomyopathy.    No ICD placement for primary prevention as LVEF is now 45%.    Hypertension:  Blood pressure is controlled.  Continue Amlodipine 10 mg daily for better blood pressure.  Optimize control using cardiomyopathy medical regimen as well.    Lazaro Parks M.D.

## 2024-12-09 DIAGNOSIS — I50.20 ACC/AHA STAGE C SYSTOLIC HEART FAILURE (HCC): ICD-10-CM

## 2024-12-09 DIAGNOSIS — I51.89 LEFT VENTRICULAR SYSTOLIC DYSFUNCTION, NYHA CLASS 2: ICD-10-CM

## 2024-12-09 RX ORDER — SPIRONOLACTONE 25 MG/1
25 TABLET ORAL DAILY
Qty: 90 TABLET | Refills: 2 | Status: SHIPPED | OUTPATIENT
Start: 2024-12-09

## 2025-02-27 ENCOUNTER — APPOINTMENT (OUTPATIENT)
Dept: MEDICAL GROUP | Facility: PHYSICIAN GROUP | Age: 60
End: 2025-02-27
Payer: COMMERCIAL

## 2025-03-03 ENCOUNTER — APPOINTMENT (OUTPATIENT)
Dept: CARDIOLOGY | Facility: MEDICAL CENTER | Age: 60
End: 2025-03-03
Attending: INTERNAL MEDICINE
Payer: COMMERCIAL

## 2025-03-05 ENCOUNTER — HOSPITAL ENCOUNTER (OUTPATIENT)
Dept: CARDIOLOGY | Facility: MEDICAL CENTER | Age: 60
End: 2025-03-05
Attending: INTERNAL MEDICINE
Payer: COMMERCIAL

## 2025-03-05 ENCOUNTER — RESULTS FOLLOW-UP (OUTPATIENT)
Dept: CARDIOLOGY | Facility: MEDICAL CENTER | Age: 60
End: 2025-03-05

## 2025-03-05 DIAGNOSIS — I51.89 LEFT VENTRICULAR SYSTOLIC DYSFUNCTION, NYHA CLASS 2: ICD-10-CM

## 2025-03-05 DIAGNOSIS — R06.09 DYSPNEA ON EXERTION: ICD-10-CM

## 2025-03-05 DIAGNOSIS — I50.20 ACC/AHA STAGE C SYSTOLIC HEART FAILURE (HCC): ICD-10-CM

## 2025-03-05 LAB
LV EJECT FRACT  99904: 55
LV EJECT FRACT MOD 2C 99903: 51.88
LV EJECT FRACT MOD 4C 99902: 53.48
LV EJECT FRACT MOD BP 99901: 53.04

## 2025-03-05 PROCEDURE — 93306 TTE W/DOPPLER COMPLETE: CPT | Mod: 26 | Performed by: INTERNAL MEDICINE

## 2025-03-05 PROCEDURE — 93306 TTE W/DOPPLER COMPLETE: CPT

## 2025-03-14 ENCOUNTER — APPOINTMENT (OUTPATIENT)
Dept: MEDICAL GROUP | Facility: PHYSICIAN GROUP | Age: 60
End: 2025-03-14
Payer: COMMERCIAL

## 2025-03-14 VITALS
OXYGEN SATURATION: 99 % | SYSTOLIC BLOOD PRESSURE: 154 MMHG | HEIGHT: 73 IN | BODY MASS INDEX: 28.86 KG/M2 | DIASTOLIC BLOOD PRESSURE: 86 MMHG | HEART RATE: 66 BPM | TEMPERATURE: 97.4 F | WEIGHT: 217.8 LBS | RESPIRATION RATE: 10 BRPM

## 2025-03-14 DIAGNOSIS — E11.65 UNCONTROLLED TYPE 2 DIABETES MELLITUS WITH HYPERGLYCEMIA (HCC): ICD-10-CM

## 2025-03-14 DIAGNOSIS — Z11.59 NEED FOR HEPATITIS C SCREENING TEST: ICD-10-CM

## 2025-03-14 DIAGNOSIS — Z11.3 SCREENING EXAMINATION FOR SEXUALLY TRANSMITTED DISEASE: ICD-10-CM

## 2025-03-14 DIAGNOSIS — Z13.6 SCREENING FOR CARDIOVASCULAR CONDITION: ICD-10-CM

## 2025-03-14 DIAGNOSIS — Z12.5 ENCOUNTER FOR SCREENING FOR MALIGNANT NEOPLASM OF PROSTATE: ICD-10-CM

## 2025-03-14 DIAGNOSIS — E11.59 HYPERTENSION ASSOCIATED WITH TYPE 2 DIABETES MELLITUS (HCC): ICD-10-CM

## 2025-03-14 DIAGNOSIS — E66.3 OVERWEIGHT: ICD-10-CM

## 2025-03-14 DIAGNOSIS — Z23 NEED FOR VACCINATION: ICD-10-CM

## 2025-03-14 DIAGNOSIS — E55.9 VITAMIN D DEFICIENCY: ICD-10-CM

## 2025-03-14 DIAGNOSIS — I42.0 DILATED CARDIOMYOPATHY (HCC): ICD-10-CM

## 2025-03-14 DIAGNOSIS — I15.2 HYPERTENSION ASSOCIATED WITH TYPE 2 DIABETES MELLITUS (HCC): ICD-10-CM

## 2025-03-14 DIAGNOSIS — I50.22 CHRONIC SYSTOLIC CONGESTIVE HEART FAILURE, NYHA CLASS 3 (HCC): ICD-10-CM

## 2025-03-14 DIAGNOSIS — Z87.891 FORMER TOBACCO USE: ICD-10-CM

## 2025-03-14 DIAGNOSIS — Z12.11 COLON CANCER SCREENING: ICD-10-CM

## 2025-03-14 DIAGNOSIS — L98.9 SKIN LESION OF FACE: ICD-10-CM

## 2025-03-14 DIAGNOSIS — E11.42 DIABETIC POLYNEUROPATHY ASSOCIATED WITH TYPE 2 DIABETES MELLITUS (HCC): ICD-10-CM

## 2025-03-14 PROBLEM — E11.40 DIABETIC NEUROPATHY (HCC): Status: ACTIVE | Noted: 2025-03-14

## 2025-03-14 LAB
HBA1C MFR BLD: 15 % (ref ?–5.8)
POCT INT CON NEG: NEGATIVE
POCT INT CON POS: POSITIVE

## 2025-03-14 PROCEDURE — 3077F SYST BP >= 140 MM HG: CPT | Performed by: PHYSICIAN ASSISTANT

## 2025-03-14 PROCEDURE — 83036 HEMOGLOBIN GLYCOSYLATED A1C: CPT | Performed by: PHYSICIAN ASSISTANT

## 2025-03-14 PROCEDURE — 90715 TDAP VACCINE 7 YRS/> IM: CPT | Performed by: PHYSICIAN ASSISTANT

## 2025-03-14 PROCEDURE — 90471 IMMUNIZATION ADMIN: CPT | Performed by: PHYSICIAN ASSISTANT

## 2025-03-14 PROCEDURE — 90472 IMMUNIZATION ADMIN EACH ADD: CPT | Performed by: PHYSICIAN ASSISTANT

## 2025-03-14 PROCEDURE — 90677 PCV20 VACCINE IM: CPT | Performed by: PHYSICIAN ASSISTANT

## 2025-03-14 PROCEDURE — 99204 OFFICE O/P NEW MOD 45 MIN: CPT | Mod: 25 | Performed by: PHYSICIAN ASSISTANT

## 2025-03-14 PROCEDURE — 3079F DIAST BP 80-89 MM HG: CPT | Performed by: PHYSICIAN ASSISTANT

## 2025-03-14 RX ORDER — PROCHLORPERAZINE 25 MG/1
SUPPOSITORY RECTAL
Qty: 3 EACH | Refills: 1 | Status: SHIPPED | OUTPATIENT
Start: 2025-03-14

## 2025-03-14 RX ORDER — PIOGLITAZONE 15 MG/1
15 TABLET ORAL DAILY
Qty: 100 TABLET | Refills: 0 | Status: SHIPPED | OUTPATIENT
Start: 2025-03-14 | End: 2026-04-18

## 2025-03-14 RX ORDER — PROCHLORPERAZINE 25 MG/1
SUPPOSITORY RECTAL
Qty: 1 EACH | Refills: 0 | Status: SHIPPED | OUTPATIENT
Start: 2025-03-14

## 2025-03-14 RX ORDER — INSULIN DETEMIR 100 [IU]/ML
10 INJECTION, SOLUTION SUBCUTANEOUS EVERY EVENING
Qty: 3 EACH | Refills: 1 | Status: SHIPPED | OUTPATIENT
Start: 2025-03-14

## 2025-03-14 ASSESSMENT — ENCOUNTER SYMPTOMS
CHILLS: 0
FEVER: 0
SHORTNESS OF BREATH: 0

## 2025-03-14 ASSESSMENT — PATIENT HEALTH QUESTIONNAIRE - PHQ9: CLINICAL INTERPRETATION OF PHQ2 SCORE: 0

## 2025-03-14 NOTE — ASSESSMENT & PLAN NOTE
Chronic, uncontrolled.     Current Diabetes Medication Regimen  Jardiance 25 mg daily  Adding Lantus 10 units nightly with a printed a schedule for the patient on how to increase based on morning fasting sugars  Pioglitazone 15 mg    Previous Diabetes Medications and Reason for Discontinuation  Metformin (diarrhea)    A1c: >15 (3/2025); 8.7 (10/2014)  Microalb/Cr ratio:   Fasting sugars:   Diabetic foot exam: 3/2025  Retinal eye exam:   ACEi/ARB: Losartan 100 mg  Statin: Need to add.  Will discuss at follow-up, April 2025.  Aspirin:  Concomitant HTN:  Nightly foot checks:
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
## 2025-03-19 NOTE — Clinical Note
REFERRAL APPROVAL NOTICE         Sent on March 19, 2025                   Ousmane Galloway  [address] [place] [address]                   Dear Mr. Galloway,    After a careful review of the medical information and benefit coverage, [facility] has processed your referral. See below for additional details.    If applicable, you must be actively enrolled with your insurance for coverage of the authorized service. If you have any questions regarding your coverage, please contact your insurance directly.    REFERRAL INFORMATION   Referral #:  [id number]  Referred-To Provider    Referred-By Provider:  Gastroenterology    Sarah Steve P.A.-C.   [facility]      [address]  [place] [id number] [phone] [address] [place] [address]  [phone]    Referral Start Date:  03/14/2025  Referral End Date:   03/15/2026             SCHEDULING  If you do not already have an appointment, please call [phone] to make an appointment.     MORE INFORMATION  If you do not already have a [employer] account, sign up at: [email or web address]  You can access your medical information, make appointments, see lab results, billing information, and more.  If you have questions regarding this referral, please contact  the [facility] Referrals department at:             [phone]. Monday - Friday 8:00AM - 5:00PM.     Sincerely,    [facility]

## 2025-03-19 NOTE — Clinical Note
REFERRAL APPROVAL NOTICE         Sent on March 19, 2025                   Ousmane Galloway  [address]  [place] [address]                   Dear Mr. Galloway,    After a careful review of the medical information and benefit coverage, [facility] has processed your referral. See below for additional details.    If applicable, you must be actively enrolled with your insurance for coverage of the authorized service. If you have any questions regarding your coverage, please contact your insurance directly.    REFERRAL INFORMATION   Referral #:  [id number]  Referred-To Department    Referred-By Provider:  Dermatology    Sarah Steve P.A.-C.   [facility]      [address]  [place] [id number] [phone] [address]  [place] [address] [phone]    Referral Start Date:  03/14/2025  Referral End Date:   03/14/2026             SCHEDULING  If you do not already have an appointment, please call [phone] to make an appointment.     MORE INFORMATION  If you do not already have a [employer] account, sign up at: [email or web address]  You can access your medical information, make appointments, see lab results, billing information, and more.  If you have questions regarding this referral, please contact  the [facility] Referrals department at:             [phone]. Monday - Friday 8:00AM - 5:00PM.     Sincerely,    [facility]

## 2025-04-24 ENCOUNTER — TELEMEDICINE (OUTPATIENT)
Dept: MEDICAL GROUP | Facility: PHYSICIAN GROUP | Age: 60
End: 2025-04-24
Payer: COMMERCIAL

## 2025-04-24 VITALS
HEIGHT: 74 IN | WEIGHT: 215 LBS | SYSTOLIC BLOOD PRESSURE: 136 MMHG | BODY MASS INDEX: 27.59 KG/M2 | RESPIRATION RATE: 18 BRPM | DIASTOLIC BLOOD PRESSURE: 78 MMHG

## 2025-04-24 DIAGNOSIS — E11.65 UNCONTROLLED TYPE 2 DIABETES MELLITUS WITH HYPERGLYCEMIA (HCC): ICD-10-CM

## 2025-04-24 RX ORDER — PIOGLITAZONE 30 MG/1
30 TABLET ORAL DAILY
Qty: 90 TABLET | Refills: 0 | Status: SHIPPED | OUTPATIENT
Start: 2025-04-24 | End: 2026-05-29

## 2025-04-24 ASSESSMENT — ENCOUNTER SYMPTOMS
CHILLS: 0
SHORTNESS OF BREATH: 0
FEVER: 0

## 2025-04-24 NOTE — PROGRESS NOTES
Virtual Visit:  This visit was conducted via Zoom using secure and encrypted videoconferencing technology.   The patient was in their home in the [facility].    The patient's identity was confirmed and verbal consent was obtained for this virtual visit.   SUBJECTIVE:     CC: blood sugar follow up; Established care 3/14/2025    HPI:   Ousmane presents today with the following:    ASSESSMENT & PLAN by Problem:     Problem   Uncontrolled Type 2 Diabetes Mellitus With Hyperglycemia (Hcc)    Chronic, uncontrolled.   Improving.  Freestyle Maranda is much cheaper --> will let me know if I need to order it (half the price of Dexcom)    Current Diabetes Medication Regimen  Jardiance 25 mg daily  Adding Lantus 10 units nightly with a printed a schedule for the patient on how to increase based on morning fasting sugars  Pioglitazone 15 mg --> increase to 30mg    Has worked with cardiology for discounted pricing but nothing has worked.  Costing him $400/month after insurance.           Will discuss statin and ASA 81 mg at follow-up, May 2025    Colon cancer screening: Referred for colonoscopy 3/14/2025    Return to clinic end of May 2025 with pre-clinic labs/diabetes follow-up.    Due for labs, ordered 3/14/2025:      Return in about 4 weeks (around 5/22/2025).         HPI:     Problem List Items Addressed This Visit       Uncontrolled type 2 diabetes mellitus with hyperglycemia (HCC)    Chronic, uncontrolled.   Improving.  Freestyle Maranda is much cheaper --> will let me know if I need to order it (half the price of Dexcom)    Current Diabetes Medication Regimen  Jardiance 25 mg daily  Lantus: Was as high as 14U q HS; now at 10U  Pioglitazone 15 mg --> increase to 30mg    Previous Diabetes Medications and Reason for Discontinuation  Metformin (diarrhea)    A1c: >15 (3/2025); 8.7 (10/2014)  Microalb/Cr ratio:   Fasting sugars: 120s or less  Diabetic foot exam: 3/2025  Retinal eye exam:   ACEi/ARB: Losartan 100 mg  Statin: Need  "to add.  Will discuss at follow-up, May 2025.  Aspirin:  Concomitant HTN:  Nightly foot checks:            Relevant Medications    pioglitazone (ACTOS) 30 MG Tab              ROS:  Review of Systems   Constitutional:  Negative for chills and fever.   Respiratory:  Negative for shortness of breath.    Cardiovascular:  Negative for chest pain.       OBJECTIVE:     Exam:  /78 (BP Location: Left arm) Comment: Per Patient  Resp 18   Ht 1.88 m (6' 2\") Comment: Per Patient  Wt 97.5 kg (215 lb) Comment: Per Patient  BMI 27.60 kg/m²  Body mass index is 27.6 kg/m².    Physical Exam  Vitals reviewed.   Constitutional:       General: He is not in acute distress.     Appearance: Normal appearance.   Pulmonary:      Effort: Pulmonary effort is normal.   Neurological:      General: No focal deficit present.      Mental Status: He is alert.   Psychiatric:         Mood and Affect: Mood normal.         Behavior: Behavior normal.         Judgment: Judgment normal.              Please note that this dictation was created using voice recognition software. I have made every reasonable attempt to correct obvious errors, but I expect that there are errors of grammar and possibly content that I did not discover before finalizing the note.zing the note.  "

## 2025-04-24 NOTE — ASSESSMENT & PLAN NOTE
Chronic, uncontrolled.   Improving.  Freestyle Maranda is much cheaper --> will let me know if I need to order it (half the price of Dexcom)    Current Diabetes Medication Regimen  Jardiance 25 mg daily  Lantus: Was as high as 14U q HS; now at 10U  Pioglitazone 15 mg --> increase to 30mg    Previous Diabetes Medications and Reason for Discontinuation  Metformin (diarrhea)    A1c: >15 (3/2025); 8.7 (10/2014)  Microalb/Cr ratio:   Fasting sugars: 120s or less  Diabetic foot exam: 3/2025  Retinal eye exam:   ACEi/ARB: Losartan 100 mg  Statin: Need to add.  Will discuss at follow-up, May 2025.  Aspirin:  Concomitant HTN:  Nightly foot checks:

## 2025-05-05 ENCOUNTER — HOSPITAL ENCOUNTER (OUTPATIENT)
Dept: RADIOLOGY | Facility: MEDICAL CENTER | Age: 60
End: 2025-05-05
Attending: NURSE PRACTITIONER
Payer: COMMERCIAL

## 2025-05-05 ENCOUNTER — OFFICE VISIT (OUTPATIENT)
Dept: DERMATOLOGY | Facility: IMAGING CENTER | Age: 60
End: 2025-05-05
Payer: COMMERCIAL

## 2025-05-05 DIAGNOSIS — M79.89 SOFT TISSUE MASS: ICD-10-CM

## 2025-05-05 PROCEDURE — 76536 US EXAM OF HEAD AND NECK: CPT

## 2025-05-05 PROCEDURE — 99203 OFFICE O/P NEW LOW 30 MIN: CPT | Performed by: NURSE PRACTITIONER

## 2025-05-05 NOTE — PROGRESS NOTES
"DERMATOLOGY NOTE  NEW VISIT       Chief complaint: New Patient (Spot check )     HPI/location: Forehead  Time present: \"long time\"  Painful lesion: No  Itching lesion: No  Enlarging lesion: Yes      History of skin cancer: No  History of precancers/actinic keratoses: No  History of biopsies:Yes, Details: Benign  History of blistering/severe sunburns:Yes, Details: Early 20's  Family history of skin cancer:No  Family history of atypical moles:No      Allergies   Allergen Reactions    Kiwi Extract Swelling        MEDICATIONS:  Medications relevant to specialty reviewed.     REVIEW OF SYSTEMS:   Positive for skin (see HPI)  Negative for fevers and chills       EXAM:  There were no vitals taken for this visit.  Constitutional: Well-developed, well-nourished, and in no distress.     A focused skin exam was performed including the affected areas of the face. Notable findings on exam today listed below and/or in assessment/plan.     Approx. 3.5 cm cystic structure to R upper forehead, soft, mobile, NTTP    IMPRESSION / PLAN:    1. Soft tissue mass  Suspect lipoma, will get US for further eval. Pt would like removed, getting bigger, outside a lot and wear hat. PA form completed. No to all pre-op questions, pic in media    - US-SOFT TISSUES OF HEAD - NECK; Future      Patient verbalized understanding and agrees with plan regarding the above        Please note that this dictation was created using voice recognition software. I have made every reasonable attempt to correct obvious errors, but I expect that there are errors of grammar and possibly content that I did not discover before finalizing the note.      Return to clinic in: Return for pending PA for cyst excision, PA. and as needed for any new or changing skin lesions.        "

## 2025-05-21 DIAGNOSIS — E11.65 UNCONTROLLED TYPE 2 DIABETES MELLITUS WITH HYPERGLYCEMIA (HCC): ICD-10-CM

## 2025-05-21 RX ORDER — PROCHLORPERAZINE 25 MG/1
SUPPOSITORY RECTAL
Qty: 3 EACH | Refills: 1 | Status: SHIPPED | OUTPATIENT
Start: 2025-05-21

## 2025-05-21 NOTE — TELEPHONE ENCOUNTER
Received request via: Pharmacy    Was the patient seen in the last year in this department? Yes    Does the patient have an active prescription (recently filled or refills available) for medication(s) requested? No    [facility]    Does the patient have correction Plus and need 100-day supply? (This applies to ALL medications) Patient does not have SCP

## 2025-05-27 ENCOUNTER — OFFICE VISIT (OUTPATIENT)
Dept: DERMATOLOGY | Facility: IMAGING CENTER | Age: 60
End: 2025-05-27
Payer: COMMERCIAL

## 2025-05-27 VITALS
SYSTOLIC BLOOD PRESSURE: 142 MMHG | WEIGHT: 210 LBS | BODY MASS INDEX: 26.96 KG/M2 | DIASTOLIC BLOOD PRESSURE: 78 MMHG | TEMPERATURE: 97.4 F

## 2025-05-27 DIAGNOSIS — D48.5 NEOPLASM OF UNCERTAIN BEHAVIOR OF SKIN: Primary | ICD-10-CM

## 2025-05-27 NOTE — PROGRESS NOTES
Surgical Procedure Note - Excision    Allergies reviewed: Yes  Pacemaker/defibrillator: No  Artificial joints: No  Antibiotics given: No  Blood thinners/anticoagulants: No    Preoperative diagnosis   D48.5  Postoperative diagnosis   Same  Procedure performed    Excision  Site      R forehead  Pre-op Size                                                    4 cm  Post-op Size:     4 cm  Symptoms:     Pain with hats      The risks and benefits of surgery were discussed with the patient. Risks include but are not limited to bleeding, scar, infection, recurrence, incomplete removal of skin lesion, and damage to nerves and blood vessels.  Written informed consent was obtained. Time-out was performed.    Procedure: Area of surgery was prepped with alcohol, marked with sterile marking pen. Anesthesia with 1% lidocaine with 1:100,000 epinephrine administered with 30 gauge needle. The area was cleaned with chlorhexidine. With sterile technique, a 15 blade scalpel was used to make an incision. Dissection was performed with scalpel and tissue scissors, and the lesion was removed. Bleeding was minimal, and hemostasis was achieved with pressure, and electrodesiccation. Specimen was placed into biopsy container and sent to pathology by staff.    Intermediate Closure  Surgical site was prepped. Undermining was performed to minimize wound tension. Buried butterfly dermal sutures were placed with 4-0 Monocryl. Superficial running sutures with 5-0 Prolene were placed to approximate wound edge. Patient tolerated procedure well and there were no complications, blood loss was minimal.     Final wound size: 4 cm    The wound was cleansed, vaseline applied, and a pressure dressing was applied. Wound care was discussed with the patient, and written instructions were provided.     Patient to call us if any problems or concerns with the procedure site arise prior to scheduled appointment.     Suture removal: Yes 10 days    Taylor Giron  MD [facility]

## 2025-06-03 ENCOUNTER — RESULTS FOLLOW-UP (OUTPATIENT)
Dept: DERMATOLOGY | Facility: IMAGING CENTER | Age: 60
End: 2025-06-03

## 2025-06-09 ENCOUNTER — NON-PROVIDER VISIT (OUTPATIENT)
Dept: DERMATOLOGY | Facility: IMAGING CENTER | Age: 60
End: 2025-06-09
Payer: COMMERCIAL

## 2025-06-09 NOTE — PROGRESS NOTES
Ousmane Galloway is a 59 y.o. male here for a Non-Provider Visit for Suture Removal.    Sutures were placed by Dr. Giron on date: 05/27/25  Skin is healed: Yes  Provider notified if skin is not healed, or if there is redness, heat, pain, or drainage from incision: N\A  Sutures removed.   Mastisol and steristips are placed: No    Advised to use emollient (vaseline, aquaphor, etc.) as needed, avoid peroxide and antibiotic ointment to reduce irritation.     Path report has been reviewed by provider.  Path report has been reviewed with patient.

## 2025-06-20 DIAGNOSIS — E11.65 UNCONTROLLED TYPE 2 DIABETES MELLITUS WITH HYPERGLYCEMIA (HCC): ICD-10-CM

## 2025-06-23 RX ORDER — PROCHLORPERAZINE 25 MG/1
SUPPOSITORY RECTAL
Qty: 1 EACH | Refills: 0 | Status: SHIPPED | OUTPATIENT
Start: 2025-06-23

## 2025-06-23 NOTE — TELEPHONE ENCOUNTER
Received request via: Pharmacy    Was the patient seen in the last year in this department? Yes    Does the patient have an active prescription (recently filled or refills available) for medication(s) requested? No    [facility]    Does the patient have halfway Plus and need 100-day supply? (This applies to ALL medications) Patient does not have SCP

## 2025-07-09 ENCOUNTER — APPOINTMENT (OUTPATIENT)
Dept: MEDICAL GROUP | Facility: PHYSICIAN GROUP | Age: 60
End: 2025-07-09
Payer: COMMERCIAL

## 2025-07-20 DIAGNOSIS — E11.65 UNCONTROLLED TYPE 2 DIABETES MELLITUS WITH HYPERGLYCEMIA (HCC): ICD-10-CM

## 2025-07-21 RX ORDER — PIOGLITAZONE 30 MG/1
30 TABLET ORAL DAILY
Qty: 90 TABLET | Refills: 0 | Status: SHIPPED | OUTPATIENT
Start: 2025-07-21 | End: 2026-08-25

## 2025-07-21 NOTE — TELEPHONE ENCOUNTER
Received request via: Pharmacy    Was the patient seen in the last year in this department? Yes    Does the patient have an active prescription (recently filled or refills available) for medication(s) requested? No    [facility]    Does the patient have half-way Plus and need 100-day supply? (This applies to ALL medications) Patient does not have SCP

## 2025-07-22 ENCOUNTER — TELEMEDICINE (OUTPATIENT)
Dept: MEDICAL GROUP | Facility: PHYSICIAN GROUP | Age: 60
End: 2025-07-22
Payer: COMMERCIAL

## 2025-07-22 VITALS — RESPIRATION RATE: 14 BRPM

## 2025-07-22 DIAGNOSIS — I15.2 HYPERTENSION ASSOCIATED WITH TYPE 2 DIABETES MELLITUS (HCC): ICD-10-CM

## 2025-07-22 DIAGNOSIS — Z11.3 SCREENING EXAMINATION FOR SEXUALLY TRANSMITTED DISEASE: ICD-10-CM

## 2025-07-22 DIAGNOSIS — D64.9 ANEMIA, UNSPECIFIED TYPE: ICD-10-CM

## 2025-07-22 DIAGNOSIS — E11.8 CONTROLLED TYPE 2 DIABETES MELLITUS WITH COMPLICATION, WITHOUT LONG-TERM CURRENT USE OF INSULIN (HCC): Primary | ICD-10-CM

## 2025-07-22 DIAGNOSIS — Z12.5 ENCOUNTER FOR SCREENING FOR MALIGNANT NEOPLASM OF PROSTATE: ICD-10-CM

## 2025-07-22 DIAGNOSIS — I50.22 CHRONIC SYSTOLIC CONGESTIVE HEART FAILURE, NYHA CLASS 3 (HCC): ICD-10-CM

## 2025-07-22 DIAGNOSIS — I42.0 DILATED CARDIOMYOPATHY (HCC): ICD-10-CM

## 2025-07-22 DIAGNOSIS — E87.5 HYPERKALEMIA: ICD-10-CM

## 2025-07-22 DIAGNOSIS — E11.42 DIABETIC POLYNEUROPATHY ASSOCIATED WITH TYPE 2 DIABETES MELLITUS (HCC): ICD-10-CM

## 2025-07-22 DIAGNOSIS — Z12.11 COLON CANCER SCREENING: ICD-10-CM

## 2025-07-22 DIAGNOSIS — E11.59 HYPERTENSION ASSOCIATED WITH TYPE 2 DIABETES MELLITUS (HCC): ICD-10-CM

## 2025-07-22 DIAGNOSIS — N18.32 STAGE 3B CHRONIC KIDNEY DISEASE: ICD-10-CM

## 2025-07-22 PROCEDURE — 98007 SYNCH AUDIO-VIDEO EST HI 40: CPT | Mod: 95 | Performed by: PHYSICIAN ASSISTANT

## 2025-07-22 ASSESSMENT — ENCOUNTER SYMPTOMS
SHORTNESS OF BREATH: 0
CHILLS: 0
FEVER: 0

## 2025-07-22 NOTE — ASSESSMENT & PLAN NOTE
Chronic, previously uncontrolled.  Patient states his fasting sugars are 90s - low 100s.  Using Dexcom.    Current Diabetes Medication Regimen  Jardiance 25 mg daily  Pioglitazone 30 mg     Previous Diabetes Medications and Reason for Discontinuation  Metformin (diarrhea)    A1c: >15 (3/2025); 8.7 (10/2014)  Microalb/Cr ratio: 832 (7/3/2025)  Fasting sugars: 120s or less  Diabetic foot exam: 3/2025  Retinal eye exam:   ACEi/ARB: Losartan 100 mg  Statin: Need to add.     Aspirin:  Concomitant HTN:  Nightly foot checks:

## 2025-07-22 NOTE — PROGRESS NOTES
Virtual Visit:  This visit was conducted via Zoom using secure and encrypted videoconferencing technology.   The patient was in their home in the [facility].    The patient's identity was confirmed and verbal consent was obtained for this virtual visit.   SUBJECTIVE:     CC: Follow-up labs    HPI:   Mack presents today with the following:    ASSESSMENT & PLAN by Problem:     Problem   Stage 3b Chronic Kidney Disease    Chronic, uncontrolled.  GFR 38 (7/3/2025).  Microalbumin creatinine ratio: 832 (7/3/2025)    CrCl: 54.86 (based on 7/3/2025 labs)     Encounter for Screening for Malignant Neoplasm of Prostate    0.7 (7/3/2025)     Hyperkalemia    Incidental finding 7/3/2025.  Question if this was due to hemolysis.  Repeating labs.  Patient thinks he can go 7/25/2025.     Diabetic Neuropathy (Hcc)    Chronic, uncontrolled.  A1c >15% (3/2025).  Complains of tingling, no pain.  Diffuse decrease sensation bilaterally.     Chronic Systolic Congestive Heart Failure, Nyha Class 3 (Hcc)    Chronic, ongoing.  Managed by cardiology.  Last evaluated 8/20/2024.  Patient states he will call for follow-up.     Hypertension Associated With Type 2 Diabetes Mellitus (Hcc)    Chronic, currently elevated.  Managed by cardiology.  Last evaluated 8/20/2024, due for follow-up.  Tolerate/continue amlodipine 10 mg, losartan 100 mg, spironolactone 25 mg, furosemide 40 mg.     Controlled Type 2 Diabetes Mellitus With Complication, Without Long-Term Current Use of Insulin (Hcc)    Chronic, previously uncontrolled.  Patient states his fasting sugars are 90s - low 100s.  Using Dexcom.    Current Diabetes Medication Regimen  Jardiance 25 mg daily  Pioglitazone 30 mg     Has worked with cardiology for Jardiance discounted pricing but nothing has worked.  Costing him $400/month after insurance.       Dilated Cardiomyopathy (Hcc)    Chronic, ongoing.  Managed by cardiology.  Chronic, ongoing.  Managed by cardiology.  Last evaluated  8/20/2024.  Patient states he will call for follow-up.          Colon cancer screening: Cologuard ordered    Repeat labs.  Faxing labs to [facility] as patient lives/works in [place]. will call patient with results and follow-up recommendations.    Return in about 3 months (around 10/22/2025) for lab discussion.      HPI:     Problem List Items Addressed This Visit       Chronic systolic congestive heart failure, NYHA class 3 (HCC)    Controlled type 2 diabetes mellitus with complication, without long-term current use of insulin (HCC) - Primary    Chronic, previously uncontrolled.  Patient states his fasting sugars are 90s - low 100s.  Using Dexcom.    Current Diabetes Medication Regimen  Jardiance 25 mg daily  Pioglitazone 30 mg     Previous Diabetes Medications and Reason for Discontinuation  Metformin (diarrhea)    A1c: >15 (3/2025); 8.7 (10/2014)  Microalb/Cr ratio: 832 (7/3/2025)  Fasting sugars: 120s or less  Diabetic foot exam: 3/2025  Retinal eye exam:   ACEi/ARB: Losartan 100 mg  Statin: Need to add.     Aspirin:  Concomitant HTN:  Nightly foot checks:            Relevant Orders    HEMOGLOBIN A1C    Diabetic neuropathy (HCC)    Dilated cardiomyopathy (HCC)    Encounter for screening for malignant neoplasm of prostate    Hyperkalemia    Relevant Orders    Basic Metabolic Panel    Hypertension associated with type 2 diabetes mellitus (HCC)    Stage 3b chronic kidney disease    Chronic, uncontrolled.  GFR: 38 (7/3/2025); 51 (2/23/2022); 39 (5/5/2021); >60 (2015).          Other Visit Diagnoses         Anemia, unspecified type        Relevant Orders    FERRITIN    IRON/TOTAL IRON BIND    VITAMIN B12    FOLATE    CBC WITH DIFFERENTIAL      Screening examination for sexually transmitted disease          Colon cancer screening        Relevant Orders    Cologuard® colon cancer screening                   ROS:  Review of Systems   Constitutional:  Negative for chills and fever.   Respiratory:  Negative for  shortness of breath.    Cardiovascular:  Negative for chest pain.       OBJECTIVE:     Exam:  Resp 14  There is no height or weight on file to calculate BMI.  (Telemedicine visit, no vitals were taken)    Physical Exam  Vitals reviewed.   Constitutional:       General: He is not in acute distress.     Appearance: Normal appearance.   Pulmonary:      Effort: Pulmonary effort is normal.   Neurological:      General: No focal deficit present.      Mental Status: He is alert.   Psychiatric:         Mood and Affect: Mood normal.         Behavior: Behavior normal.         Judgment: Judgment normal.           CHART REVIEW:     Outside labs, [facility]:     7/10/2025:  RBC 4.03  Hemoglobin 12.3 (2/23/2022, 11.7)  Hematocrit 36.3 (2/23/2022, 34.4)  Potassium 6.0  Glucose 136  BUN 41  Creatinine 1.93 (2/23/2022, 1.5; 5/5/2021, 1.9)  GFR 38 (2/23/2022, GFR 51, 5/5/2021, GFR 39; 2015 GFR >60)    HDL 65  LDL 81  Triglycerides 56  TSH 1.18  PSA 0.7  Negative hepatitis C  Microalbumin creatinine ratio 832  Vitamin D 32    POC A1c 15 (3/14/2025)         I spent a total of 41 minutes with record review, exam, communication with the patient, communication with other providers, and documentation of this encounter.      Please note that this dictation was created using voice recognition software. I have made every reasonable attempt to correct obvious errors, but I expect that there are errors of grammar and possibly content that I did not discover before finalizing the note.zing the note.

## 2025-07-25 DIAGNOSIS — E11.65 UNCONTROLLED TYPE 2 DIABETES MELLITUS WITH HYPERGLYCEMIA (HCC): ICD-10-CM

## 2025-07-25 NOTE — TELEPHONE ENCOUNTER
Received request via: Pharmacy    Was the patient seen in the last year in this department? Yes    Does the patient have an active prescription (recently filled or refills available) for medication(s) requested? No    [email or web address]    Does the patient have group home Plus and need 100-day supply? (This applies to ALL medications) Patient does not have SCP

## 2025-07-26 RX ORDER — PROCHLORPERAZINE 25 MG/1
SUPPOSITORY RECTAL
Qty: 3 EACH | Refills: 1 | Status: SHIPPED | OUTPATIENT
Start: 2025-07-26

## 2025-08-20 DIAGNOSIS — E11.65 UNCONTROLLED TYPE 2 DIABETES MELLITUS WITH HYPERGLYCEMIA (HCC): ICD-10-CM

## 2025-08-20 RX ORDER — INSULIN DETEMIR 100 [IU]/ML
INJECTION, SOLUTION SUBCUTANEOUS
Qty: 9 ML | Refills: 0 | Status: SHIPPED | OUTPATIENT
Start: 2025-08-20